# Patient Record
Sex: FEMALE | Race: WHITE | NOT HISPANIC OR LATINO | Employment: UNEMPLOYED | URBAN - METROPOLITAN AREA
[De-identification: names, ages, dates, MRNs, and addresses within clinical notes are randomized per-mention and may not be internally consistent; named-entity substitution may affect disease eponyms.]

---

## 2017-10-27 ENCOUNTER — TRANSCRIBE ORDERS (OUTPATIENT)
Dept: ADMINISTRATIVE | Facility: HOSPITAL | Age: 57
End: 2017-10-27

## 2017-10-27 DIAGNOSIS — Z78.0 POST-MENOPAUSAL: Primary | ICD-10-CM

## 2017-10-27 DIAGNOSIS — Z12.31 VISIT FOR SCREENING MAMMOGRAM: Primary | ICD-10-CM

## 2017-12-18 ENCOUNTER — HOSPITAL ENCOUNTER (OUTPATIENT)
Dept: MAMMOGRAPHY | Facility: HOSPITAL | Age: 57
Discharge: HOME | End: 2017-12-18
Attending: INTERNAL MEDICINE | Admitting: INTERNAL MEDICINE

## 2017-12-18 ENCOUNTER — HOSPITAL ENCOUNTER (OUTPATIENT)
Dept: BONE DENSITY | Facility: HOSPITAL | Age: 57
Discharge: HOME | End: 2017-12-18
Attending: INTERNAL MEDICINE

## 2017-12-18 DIAGNOSIS — Z12.31 VISIT FOR SCREENING MAMMOGRAM: ICD-10-CM

## 2017-12-18 DIAGNOSIS — Z78.0 POST-MENOPAUSAL: ICD-10-CM

## 2017-12-18 PROCEDURE — 77080 DXA BONE DENSITY AXIAL: CPT

## 2017-12-18 PROCEDURE — 77063 BREAST TOMOSYNTHESIS BI: CPT

## 2017-12-18 PROCEDURE — G0202 SCR MAMMO BI INCL CAD: HCPCS

## 2018-11-01 ENCOUNTER — TRANSCRIBE ORDERS (OUTPATIENT)
Dept: ADMINISTRATIVE | Facility: HOSPITAL | Age: 58
End: 2018-11-01

## 2018-11-01 DIAGNOSIS — R94.6 ABNORMAL THYROID FUNCTION TEST: Primary | ICD-10-CM

## 2018-11-01 DIAGNOSIS — Z12.31 VISIT FOR SCREENING MAMMOGRAM: Primary | ICD-10-CM

## 2018-12-21 ENCOUNTER — HOSPITAL ENCOUNTER (OUTPATIENT)
Dept: MAMMOGRAPHY | Facility: HOSPITAL | Age: 58
Discharge: HOME | End: 2018-12-21
Attending: INTERNAL MEDICINE | Admitting: INTERNAL MEDICINE

## 2018-12-21 ENCOUNTER — HOSPITAL ENCOUNTER (OUTPATIENT)
Dept: ULTRASOUND IMAGING | Facility: HOSPITAL | Age: 58
Discharge: HOME | End: 2018-12-21
Attending: INTERNAL MEDICINE

## 2018-12-21 DIAGNOSIS — Z12.31 VISIT FOR SCREENING MAMMOGRAM: ICD-10-CM

## 2018-12-21 DIAGNOSIS — R94.6 ABNORMAL THYROID FUNCTION TEST: ICD-10-CM

## 2018-12-21 PROCEDURE — 77067 SCR MAMMO BI INCL CAD: CPT

## 2018-12-21 PROCEDURE — 77063 BREAST TOMOSYNTHESIS BI: CPT

## 2018-12-21 PROCEDURE — 76536 US EXAM OF HEAD AND NECK: CPT

## 2019-11-01 ENCOUNTER — TRANSCRIBE ORDERS (OUTPATIENT)
Dept: ADMINISTRATIVE | Facility: HOSPITAL | Age: 59
End: 2019-11-01

## 2019-11-01 DIAGNOSIS — Z12.31 SCREENING MAMMOGRAM, ENCOUNTER FOR: Primary | ICD-10-CM

## 2020-01-25 ENCOUNTER — HOSPITAL ENCOUNTER (OUTPATIENT)
Dept: MAMMOGRAPHY | Facility: HOSPITAL | Age: 60
Discharge: HOME | End: 2020-01-25
Admitting: INTERNAL MEDICINE

## 2020-01-25 DIAGNOSIS — Z12.31 SCREENING MAMMOGRAM, ENCOUNTER FOR: ICD-10-CM

## 2020-01-25 PROCEDURE — 77063 BREAST TOMOSYNTHESIS BI: CPT

## 2020-01-25 PROCEDURE — 77067 SCR MAMMO BI INCL CAD: CPT

## 2021-09-22 ENCOUNTER — OFFICE VISIT (OUTPATIENT)
Dept: UROLOGY | Facility: CLINIC | Age: 61
End: 2021-09-22

## 2021-09-22 VITALS — HEIGHT: 63 IN | WEIGHT: 128.8 LBS | BODY MASS INDEX: 22.82 KG/M2 | RESPIRATION RATE: 17 BRPM

## 2021-09-22 DIAGNOSIS — N39.0 RECURRENT URINARY TRACT INFECTION: ICD-10-CM

## 2021-09-22 DIAGNOSIS — R31.29 MICROHEMATURIA: ICD-10-CM

## 2021-09-22 DIAGNOSIS — N39.0 RECURRENT UTI: Primary | ICD-10-CM

## 2021-09-22 LAB
BILIRUB BLD-MCNC: NEGATIVE MG/DL
CLARITY, POC: CLEAR
COLOR UR: YELLOW
GLUCOSE UR STRIP-MCNC: NEGATIVE MG/DL
KETONES UR QL: NEGATIVE
LEUKOCYTE EST, POC: ABNORMAL
NITRITE UR-MCNC: NEGATIVE MG/ML
PH UR: 6 [PH] (ref 5–8)
PROT UR STRIP-MCNC: NEGATIVE MG/DL
RBC # UR STRIP: NEGATIVE /UL
SP GR UR: 1.01 (ref 1–1.03)
URINE VOLUME: 0
UROBILINOGEN UR QL: NORMAL

## 2021-09-22 PROCEDURE — 99203 OFFICE O/P NEW LOW 30 MIN: CPT | Performed by: UROLOGY

## 2021-09-22 PROCEDURE — 51798 US URINE CAPACITY MEASURE: CPT | Performed by: UROLOGY

## 2021-09-22 RX ORDER — PANTOPRAZOLE SODIUM 40 MG/1
40 TABLET, DELAYED RELEASE ORAL NIGHTLY
COMMUNITY
Start: 2021-08-31

## 2021-09-22 RX ORDER — ATORVASTATIN CALCIUM 10 MG/1
10 TABLET, FILM COATED ORAL NIGHTLY
COMMUNITY

## 2021-09-22 RX ORDER — CEPHALEXIN 500 MG/1
500 CAPSULE ORAL DAILY
COMMUNITY
Start: 2021-08-31

## 2021-09-22 RX ORDER — CHOLECALCIFEROL (VITAMIN D3) 25 MCG
1000 CAPSULE ORAL DAILY
COMMUNITY
Start: 2021-07-23

## 2021-09-22 RX ORDER — GABAPENTIN 300 MG/1
300 CAPSULE ORAL NIGHTLY
COMMUNITY
Start: 2021-07-23

## 2021-09-22 RX ORDER — LISINOPRIL 10 MG/1
10 TABLET ORAL DAILY
COMMUNITY
Start: 2021-08-19

## 2021-09-22 RX ORDER — NITROFURANTOIN 25; 75 MG/1; MG/1
CAPSULE ORAL
COMMUNITY
Start: 2021-08-30 | End: 2021-10-18

## 2021-09-22 RX ORDER — METOPROLOL SUCCINATE 50 MG/1
50 TABLET, EXTENDED RELEASE ORAL DAILY
COMMUNITY
Start: 2021-08-19

## 2021-09-22 RX ORDER — FOLIC ACID 1 MG/1
1000 TABLET ORAL DAILY
COMMUNITY
Start: 2021-08-19

## 2021-09-22 RX ORDER — LANOLIN ALCOHOL/MO/W.PET/CERES
6 CREAM (GRAM) TOPICAL NIGHTLY PRN
COMMUNITY

## 2021-09-22 RX ORDER — MONTELUKAST SODIUM 10 MG/1
10 TABLET ORAL NIGHTLY
COMMUNITY
Start: 2021-07-28

## 2021-09-22 NOTE — PROGRESS NOTES
Chief Complaint: Urologic complaint    Subjective         History of Present Illness  Vaishali Castañeda is a 61 y.o. female presents to [facility] to be seen for microhematuria      Patient did have back surgery in 8/20 where she had a prolonged hospitalization did have a UTI at that time.    Since she has been doing better she is really been asymptomatic as far as urinary symptoms.    Patient has never had a symptomatic urinary tract infection.    Patient is on cephalexin 5 mg daily forever secondary to a Infected her on her back from last year surgery    Patient has had 3 UTIs treated in the last year.  She was not symptomatic.    5/21 Macrobid x7 days    UA 2+ blood to earlier this year    7/21 urine culture-negative  5/21 urine culture-negative    11/20 creatinine 0.4, GFR > 60  10/20 UA - moderate leukocytes, RBC 0, negative otherwise    No cultures in epic     no gross hematuria,  No history of kidney stone.    No urologic family history,   Has never had any urologic surgery.    No cardiopulmonary history.  Patient does not smoke.  Patient does not use blood thinner.          Results for orders placed or performed in visit on 09/22/21   Bladder Scan   Result Value Ref Range    Urine Volume 0    POC Urinalysis Dipstick    Specimen: Urine   Result Value Ref Range    Color Yellow Yellow, Straw, Dark Yellow, Tiara    Clarity, UA Clear Clear    Glucose, UA Negative Negative, 1000 mg/dL (3+) mg/dL    Bilirubin Negative Negative    Ketones, UA Negative Negative    Specific Gravity  1.015 1.005 - 1.030    Blood, UA Negative Negative    pH, Urine 6.0 5.0 - 8.0    Protein, POC Negative Negative mg/dL    Urobilinogen, UA Normal Normal    Leukocytes Small (1+) (A) Negative    Nitrite, UA Negative Negative         Objective     History reviewed. No pertinent past medical history.    History reviewed. No pertinent surgical history.      Current Outpatient Medications:   •  atorvastatin (LIPITOR) 10  "MG tablet, Take 10 mg by mouth., Disp: , Rfl:   •  cephalexin (KEFLEX) 500 MG capsule, Take 500 mg by mouth Daily., Disp: , Rfl:   •  Cholecalciferol (Vitamin D-3) 25 MCG (1000 UT) capsule, Take 1,000 Units by mouth Daily., Disp: , Rfl:   •  folic acid (FOLVITE) 1 MG tablet, Take 1,000 mcg by mouth Daily., Disp: , Rfl:   •  gabapentin (NEURONTIN) 300 MG capsule, TAKE 1 CAPSULE BY MOUTH THREE TIMES DAILY MAX DAILY AMOUNT 900MG (3 CAPSULES), Disp: , Rfl:   •  lisinopril (PRINIVIL,ZESTRIL) 10 MG tablet, Take 10 mg by mouth Daily., Disp: , Rfl:   •  melatonin 3 MG tablet, Take 6 mg by mouth Daily., Disp: , Rfl:   •  metoprolol succinate XL (TOPROL-XL) 50 MG 24 hr tablet, Take 50 mg by mouth Daily., Disp: , Rfl:   •  montelukast (SINGULAIR) 10 MG tablet, Take 10 mg by mouth Daily., Disp: , Rfl:   •  nitrofurantoin, macrocrystal-monohydrate, (MACROBID) 100 MG capsule, TAKE 1 CAPSULE BY MOUTH TWICE DAILY FOR 5 DAYS, Disp: , Rfl:   •  pantoprazole (PROTONIX) 40 MG EC tablet, Take 40 mg by mouth Daily., Disp: , Rfl:     Allergies   Allergen Reactions   • Sulfa Antibiotics Nausea Only        Family History   Problem Relation Age of Onset   • Breast cancer Sister        Social History     Socioeconomic History   • Marital status:      Spouse name: Not on file   • Number of children: Not on file   • Years of education: Not on file   • Highest education level: Not on file   Tobacco Use   • Smoking status: Never Smoker   • Smokeless tobacco: Never Used   Substance and Sexual Activity   • Alcohol use: Yes     Comment: 1 drink daily   • Drug use: Never       Vital Signs:   Resp 17   Ht 160 cm (63\")   Wt 58.4 kg (128 lb 12.8 oz)   BMI 22.82 kg/m²      Physical exam    Alert and orient x3  Well appearing, well developed, in no acute distress   Unlabored respirations  Nontender/nondistended      Grossly oriented to person, place and time, judgment is intact, normal mood and affect    Results for orders placed or performed " in visit on 09/22/21   Bladder Scan   Result Value Ref Range    Urine Volume 0    POC Urinalysis Dipstick    Specimen: Urine   Result Value Ref Range    Color Yellow Yellow, Straw, Dark Yellow, Tiara    Clarity, UA Clear Clear    Glucose, UA Negative Negative, 1000 mg/dL (3+) mg/dL    Bilirubin Negative Negative    Ketones, UA Negative Negative    Specific Gravity  1.015 1.005 - 1.030    Blood, UA Negative Negative    pH, Urine 6.0 5.0 - 8.0    Protein, POC Negative Negative mg/dL    Urobilinogen, UA Normal Normal    Leukocytes Small (1+) (A) Negative    Nitrite, UA Negative Negative             Assessment and Plan    Diagnoses and all orders for this visit:    1. Recurrent UTI (Primary)  -     POC Urinalysis Dipstick  -     Bladder Scan    2. Recurrent urinary tract infection    Records reviewed today    Patient after review of her records is really not having recurrent infections.  She has been asymptomatic and I discussed with her she does not need to have her urine checked if she is asymptomatic.  Discussed with the patient that that is normal for some females to get 1-2 UTIs a year.  These would usually be symptomatic with burning/dysuria or suprapubic pressure fever.  Patient voiced understanding    We did discuss ways to prevent risk of urinary tract infection today    Microhematuria    Discussed with the patient because she has had several episodes of microhematuria I do recommend microhematuria work-up.  We discussed that this work-up could risk missing a urologic malignancy which would detriment to her health or cause death.  Patient voiced understanding and would like to proceed    BMP  CT urology protocol  Office cystoscopy    Risks and benefits were discussed and patient would like to proceed

## 2021-09-29 ENCOUNTER — TELEPHONE (OUTPATIENT)
Dept: UROLOGY | Facility: CLINIC | Age: 61
End: 2021-09-29

## 2021-10-18 ENCOUNTER — PRE-ADMISSION TESTING (OUTPATIENT)
Dept: PREADMISSION TESTING | Facility: HOSPITAL | Age: 61
End: 2021-10-18

## 2021-10-18 VITALS
OXYGEN SATURATION: 99 % | HEIGHT: 63 IN | WEIGHT: 129 LBS | RESPIRATION RATE: 16 BRPM | HEART RATE: 75 BPM | BODY MASS INDEX: 22.86 KG/M2 | SYSTOLIC BLOOD PRESSURE: 133 MMHG | TEMPERATURE: 98.7 F | DIASTOLIC BLOOD PRESSURE: 81 MMHG

## 2021-10-18 LAB
ALBUMIN SERPL-MCNC: 5 G/DL (ref 3.5–5.2)
ALBUMIN/GLOB SERPL: 2.8 G/DL
ALP SERPL-CCNC: 69 U/L (ref 39–117)
ALT SERPL W P-5'-P-CCNC: 46 U/L (ref 1–33)
ANION GAP SERPL CALCULATED.3IONS-SCNC: 15.2 MMOL/L (ref 5–15)
AST SERPL-CCNC: 71 U/L (ref 1–32)
BASOPHILS # BLD AUTO: 0.08 10*3/MM3 (ref 0–0.2)
BASOPHILS NFR BLD AUTO: 1.3 % (ref 0–1.5)
BILIRUB SERPL-MCNC: 0.3 MG/DL (ref 0–1.2)
BUN SERPL-MCNC: 9 MG/DL (ref 8–23)
BUN/CREAT SERPL: 12.5 (ref 7–25)
CALCIUM SPEC-SCNC: 9.2 MG/DL (ref 8.6–10.5)
CHLORIDE SERPL-SCNC: 99 MMOL/L (ref 98–107)
CO2 SERPL-SCNC: 20.8 MMOL/L (ref 22–29)
CREAT SERPL-MCNC: 0.72 MG/DL (ref 0.57–1)
DEPRECATED RDW RBC AUTO: 39.6 FL (ref 37–54)
EOSINOPHIL # BLD AUTO: 0.15 10*3/MM3 (ref 0–0.4)
EOSINOPHIL NFR BLD AUTO: 2.5 % (ref 0.3–6.2)
ERYTHROCYTE [DISTWIDTH] IN BLOOD BY AUTOMATED COUNT: 10.8 % (ref 12.3–15.4)
GFR SERPL CREATININE-BSD FRML MDRD: 82 ML/MIN/1.73
GLOBULIN UR ELPH-MCNC: 1.8 GM/DL
GLUCOSE SERPL-MCNC: 116 MG/DL (ref 65–99)
HCT VFR BLD AUTO: 34.3 % (ref 34–46.6)
HGB BLD-MCNC: 11.5 G/DL (ref 12–15.9)
IMM GRANULOCYTES # BLD AUTO: 0.01 10*3/MM3 (ref 0–0.05)
IMM GRANULOCYTES NFR BLD AUTO: 0.2 % (ref 0–0.5)
LYMPHOCYTES # BLD AUTO: 1.99 10*3/MM3 (ref 0.7–3.1)
LYMPHOCYTES NFR BLD AUTO: 33.4 % (ref 19.6–45.3)
MCH RBC QN AUTO: 33.6 PG (ref 26.6–33)
MCHC RBC AUTO-ENTMCNC: 33.5 G/DL (ref 31.5–35.7)
MCV RBC AUTO: 100.3 FL (ref 79–97)
MONOCYTES # BLD AUTO: 0.64 10*3/MM3 (ref 0.1–0.9)
MONOCYTES NFR BLD AUTO: 10.8 % (ref 5–12)
NEUTROPHILS NFR BLD AUTO: 3.08 10*3/MM3 (ref 1.7–7)
NEUTROPHILS NFR BLD AUTO: 51.8 % (ref 42.7–76)
NRBC BLD AUTO-RTO: 0 /100 WBC (ref 0–0.2)
PLATELET # BLD AUTO: 219 10*3/MM3 (ref 140–450)
PMV BLD AUTO: 10.7 FL (ref 6–12)
POTASSIUM SERPL-SCNC: 4.6 MMOL/L (ref 3.5–5.2)
PROT SERPL-MCNC: 6.8 G/DL (ref 6–8.5)
QT INTERVAL: 372 MS
RBC # BLD AUTO: 3.42 10*6/MM3 (ref 3.77–5.28)
SARS-COV-2 ORF1AB RESP QL NAA+PROBE: NOT DETECTED
SODIUM SERPL-SCNC: 135 MMOL/L (ref 136–145)
WBC # BLD AUTO: 5.95 10*3/MM3 (ref 3.4–10.8)

## 2021-10-18 PROCEDURE — 80053 COMPREHEN METABOLIC PANEL: CPT

## 2021-10-18 PROCEDURE — 93010 ELECTROCARDIOGRAM REPORT: CPT | Performed by: INTERNAL MEDICINE

## 2021-10-18 PROCEDURE — U0004 COV-19 TEST NON-CDC HGH THRU: HCPCS | Performed by: NURSE PRACTITIONER

## 2021-10-18 PROCEDURE — 36415 COLL VENOUS BLD VENIPUNCTURE: CPT

## 2021-10-18 PROCEDURE — 85025 COMPLETE CBC W/AUTO DIFF WBC: CPT

## 2021-10-18 PROCEDURE — 93005 ELECTROCARDIOGRAM TRACING: CPT

## 2021-10-18 PROCEDURE — C9803 HOPD COVID-19 SPEC COLLECT: HCPCS | Performed by: NURSE PRACTITIONER

## 2021-10-18 RX ORDER — HYDROCODONE BITARTRATE AND ACETAMINOPHEN 7.5; 325 MG/1; MG/1
1 TABLET ORAL EVERY 6 HOURS PRN
COMMUNITY
End: 2021-10-20 | Stop reason: HOSPADM

## 2021-10-18 RX ORDER — ACETAMINOPHEN 500 MG
500 TABLET ORAL EVERY 6 HOURS PRN
COMMUNITY
End: 2021-10-20 | Stop reason: HOSPADM

## 2021-10-18 NOTE — DISCHARGE INSTRUCTIONS
Take the following medications the morning of surgery:    METOPROLOL, PANTOPRAZOLE      ARRIVAL TIME: TO BE DETERMINED  If you are on prescription narcotic pain medication to control your pain you may also take that medication the morning of surgery.    General Instructions:  • Do not eat solid food after midnight the night before surgery.  • You may drink clear liquids day of surgery but must stop at least one hour before your hospital arrival time.  • It is beneficial for you to have a clear drink that contains carbohydrates the day of surgery.  We suggest a 12 to 20 ounce bottle of Gatorade or Powerade for non-diabetic patients or a 12 to 20 ounce bottle of G2 or Powerade Zero for diabetic patients. (Pediatric patients, are not advised to drink a 12 to 20 ounce carbohydrate drink)    Clear liquids are liquids you can see through.  Nothing red in color.     Plain water                               Sports drinks  Sodas                                   Gelatin (Jell-O)  Fruit juices without pulp such as white grape juice and apple juice  Popsicles that contain no fruit or yogurt  Tea or coffee (no cream or milk added)  Gatorade / Powerade  G2 / Powerade Zero      • Patients who avoid smoking, chewing tobacco and alcohol for 4 weeks prior to surgery have a reduced risk of post-operative complications.  Quit smoking as many days before surgery as you can.  • Do not smoke, use chewing tobacco or drink alcohol the day of surgery.   • If applicable bring your C-PAP/ BI-PAP machine.  • Bring any papers given to you in the doctor’s office.  • Wear clean comfortable clothes.  • Do not wear contact lenses, false eyelashes or make-up.  Bring a case for your glasses.   • Bring crutches or walker if applicable.  • Remove all piercings.  Leave jewelry and any other valuables at home.  • Hair extensions with metal clips must be removed prior to surgery.  • The Pre-Admission Testing nurse will instruct you to bring medications  if unable to obtain an accurate list in Pre-Admission Testing.          Preventing a Surgical Site Infection:  • For 2 to 3 days before surgery, avoid shaving with a razor because the razor can irritate skin and make it easier to develop an infection.    • Any areas of open skin can increase the risk of a post-operative wound infection by allowing bacteria to enter and travel throughout the body.  Notify your surgeon if you have any skin wounds / rashes even if it is not near the expected surgical site.  The area will need assessed to determine if surgery should be delayed until it is healed.  • The night prior to surgery shower using a fresh bar of anti-bacterial soap (such as Dial) and clean washcloth.  Sleep in a clean bed with clean clothing.  Do not allow pets to sleep with you.  • Shower on the morning of surgery using a fresh bar of anti-bacterial soap (such as Dial) and clean washcloth.  Dry with a clean towel and dress in clean clothing.  • Ask your surgeon if you will be receiving antibiotics prior to surgery.  • Make sure you, your family, and all healthcare providers clean their hands with soap and water or an alcohol based hand  before caring for you or your wound.    Day of surgery:  Your arrival time is approximately two hours before your scheduled surgery time.  Upon arrival, a Pre-op nurse and Anesthesiologist will review your health history, obtain vital signs, and answer questions you may have.  The only belongings needed at this time will be a list of your home medications and if applicable your C-PAP/BI-PAP machine.  A Pre-op nurse will start an IV and you may receive medication in preparation for surgery, including something to help you relax.     Please be aware that surgery does come with discomfort.  We want to make every effort to control your discomfort so please discuss any uncontrolled symptoms with your nurse.   Your doctor will most likely have prescribed pain medications.       If you are going home after surgery you will receive individualized written care instructions before being discharged.  A responsible adult must drive you to and from the hospital on the day of your surgery and stay with you for 24 hours.  Discharge prescriptions can be filled by the hospital pharmacy during regular pharmacy hours.  If you are having surgery late in the day/evening your prescription may be e-prescribed to your pharmacy.  Please verify your pharmacy hours or chose a 24 hour pharmacy to avoid not having access to your prescription because your pharmacy has closed for the day.    If you are staying overnight following surgery, you will be transported to your hospital room following the recovery period.  [facility] has all private rooms.    If you have any questions please call Pre-Admission Testing at [phone].  Deductibles and co-payments are collected on the day of service. Please be prepared to pay the required co-pay, deductible or deposit on the day of service as defined by your plan.    Patient Education for Self-Quarantine Process    • Following your COVID testing, we strongly recommend that you wear a mask when you are with other people and practice social distancing.   • Limit your activities to only required outings.  • Wash your hands with soap and water frequently for at least 20 seconds.   • Avoid touching your eyes, nose and mouth with unwashed hands.  • Do not share anything - utensils, drinking glasses, food from the same bowl.   • Sanitize household surfaces daily. Include all high touch areas (door handles, light switches, phones, countertops, etc.)    Call your surgeon immediately if you experience any of the following symptoms:  • Sore Throat  • Shortness of Breath or difficulty breathing  • Cough  • Chills  • Body soreness or muscle pain  • Headache  • Fever  • New loss of taste or smell  • Do not arrive for your surgery ill.  Your procedure will need to be  rescheduled to another time.  You will need to call your physician before the day of surgery to avoid any unnecessary exposure to hospital staff as well as other patients.

## 2021-10-19 RX ORDER — CEFAZOLIN SODIUM 2 G/100ML
2 INJECTION, SOLUTION INTRAVENOUS ONCE
Status: COMPLETED | OUTPATIENT
Start: 2021-10-19 | End: 2021-10-20

## 2021-10-19 NOTE — H&P
Provider: DION TORRES MD  [facility]  Patient is here for recheck of her right shoulder and to discuss the findings of her MRI scan. She still cannot  anything of any significant weight without anterior shoulder pain. She cannot sleep due to the right shoulder pain. This was not improved with the injection that she received a couple months ago. She has been to physical therapy and feels like her spine and neck have improved but her right shoulder has not.  Social history was automatically updated to reflect changes to the patient's age and marital status.    Allergies, medications, past medical history, surgical history, family history, social history and ROS were reviewed and unchanged (08/26/2021).    PCP Dr. DAPHNE MARQUIS, [facility]is[facility] Frisian    Physical Exam  Height:  64 in.    Weight:  125 lbs.     BMI:  21.53    Right Shoulder  Skin is normal.  There is no atrophy.  There is no effusion.  There is no warmth.  No erythema.  Lymphadenopathy is negative.  Right shoulder active ROM today shows: Elevation = 160; ER(side) = 60; ER(abd) = 70; IR(abd) = 60; IR(vert) = to waist; Abduction = 120.  Shoulder strength: Elevation = 3/5; ER = 4/5; IR = 5-/5; ABD = 4/5.  Neer test is positive.  Sanford test is positive.  Arc of motion is positive.  Empty can test was positive.  Speed's test is positive.  Yergason's test is positive.  Pulses are normal.  Normal sensation.  Capillary refill is normal.        Imaging/Diagnostic Studies  Right shoulder MRI shows 1. Full-thickness tear of superior fibers of subscapularis  2. Medial dislocation of biceps tendon  3. 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 I think she would benefit from arthroscopy of the right shoulder for bicep tenodesis, rotator cuff repair, subscapularis  repair and acromioplasty. She is going to consult with her schedule and her family and will get back to us regarding timing of scheduling. Today we discussed the procedure in detail.  We discussed the need for physical therapy, immobilization with the sling and the expected recovery period.  We discussed both the acute and long-term risks of the surgery.  All questions were answered and informed consent was obtained.          Electronically signed by DION TORRES MD on 08/26/2021 at 10:07 AM    ________________________________________________________________________

## 2021-10-20 ENCOUNTER — HOSPITAL ENCOUNTER (OUTPATIENT)
Facility: HOSPITAL | Age: 61
Setting detail: HOSPITAL OUTPATIENT SURGERY
Discharge: HOME | End: 2021-10-20
Attending: ORTHOPAEDIC SURGERY | Admitting: ORTHOPAEDIC SURGERY

## 2021-10-20 ENCOUNTER — ANESTHESIA EVENT (OUTPATIENT)
Dept: PERIOP | Facility: HOSPITAL | Age: 61
End: 2021-10-20

## 2021-10-20 ENCOUNTER — ANESTHESIA (OUTPATIENT)
Dept: PERIOP | Facility: HOSPITAL | Age: 61
End: 2021-10-20

## 2021-10-20 VITALS
RESPIRATION RATE: 16 BRPM | OXYGEN SATURATION: 96 % | HEART RATE: 65 BPM | SYSTOLIC BLOOD PRESSURE: 145 MMHG | DIASTOLIC BLOOD PRESSURE: 73 MMHG | TEMPERATURE: 97.4 F

## 2021-10-20 PROCEDURE — 25010000002 MIDAZOLAM PER 1 MG: Performed by: ANESTHESIOLOGY

## 2021-10-20 PROCEDURE — 25010000002 EPINEPHRINE PER 0.1 MG: Performed by: ORTHOPAEDIC SURGERY

## 2021-10-20 PROCEDURE — 25010000002 FENTANYL CITRATE (PF) 50 MCG/ML SOLUTION: Performed by: NURSE ANESTHETIST, CERTIFIED REGISTERED

## 2021-10-20 PROCEDURE — 25010000002 PHENYLEPHRINE 10 MG/ML SOLUTION: Performed by: NURSE ANESTHETIST, CERTIFIED REGISTERED

## 2021-10-20 PROCEDURE — 25010000002 FENTANYL CITRATE (PF) 50 MCG/ML SOLUTION: Performed by: ANESTHESIOLOGY

## 2021-10-20 PROCEDURE — C1713 ANCHOR/SCREW BN/BN,TIS/BN: HCPCS | Performed by: ORTHOPAEDIC SURGERY

## 2021-10-20 PROCEDURE — 25010000002 METHYLPREDNISOLONE PER 40 MG: Performed by: ANESTHESIOLOGY

## 2021-10-20 PROCEDURE — 25010000002 NEOSTIGMINE 5 MG/10ML SOLUTION: Performed by: NURSE ANESTHETIST, CERTIFIED REGISTERED

## 2021-10-20 PROCEDURE — 76942 ECHO GUIDE FOR BIOPSY: CPT | Performed by: ORTHOPAEDIC SURGERY

## 2021-10-20 PROCEDURE — 25010000002 ONDANSETRON PER 1 MG: Performed by: NURSE ANESTHETIST, CERTIFIED REGISTERED

## 2021-10-20 PROCEDURE — 25010000002 PROPOFOL 10 MG/ML EMULSION: Performed by: NURSE ANESTHETIST, CERTIFIED REGISTERED

## 2021-10-20 PROCEDURE — 25010000002 ROPIVACAINE PER 1 MG: Performed by: ANESTHESIOLOGY

## 2021-10-20 PROCEDURE — 25010000003 CEFAZOLIN IN DEXTROSE 2-4 GM/100ML-% SOLUTION: Performed by: ORTHOPAEDIC SURGERY

## 2021-10-20 DEVICE — IMPLANTABLE DEVICE
Type: IMPLANTABLE DEVICE | Site: SHOULDER | Status: FUNCTIONAL
Brand: JUGGERKNOT SOFT ANCHORS

## 2021-10-20 RX ORDER — DIPHENHYDRAMINE HCL 25 MG
25 CAPSULE ORAL
Status: DISCONTINUED | OUTPATIENT
Start: 2021-10-20 | End: 2021-10-20 | Stop reason: HOSPADM

## 2021-10-20 RX ORDER — ONDANSETRON 2 MG/ML
INJECTION INTRAMUSCULAR; INTRAVENOUS AS NEEDED
Status: DISCONTINUED | OUTPATIENT
Start: 2021-10-20 | End: 2021-10-20 | Stop reason: SURG

## 2021-10-20 RX ORDER — LIDOCAINE HYDROCHLORIDE AND EPINEPHRINE BITARTRATE 20; .01 MG/ML; MG/ML
INJECTION, SOLUTION SUBCUTANEOUS
Status: COMPLETED | OUTPATIENT
Start: 2021-10-20 | End: 2021-10-20

## 2021-10-20 RX ORDER — MIDAZOLAM HYDROCHLORIDE 1 MG/ML
1 INJECTION INTRAMUSCULAR; INTRAVENOUS
Status: DISCONTINUED | OUTPATIENT
Start: 2021-10-20 | End: 2021-10-20 | Stop reason: HOSPADM

## 2021-10-20 RX ORDER — OXYCODONE HYDROCHLORIDE AND ACETAMINOPHEN 5; 325 MG/1; MG/1
1 TABLET ORAL EVERY 4 HOURS PRN
Qty: 40 TABLET | Refills: 0 | Status: SHIPPED | OUTPATIENT
Start: 2021-10-20

## 2021-10-20 RX ORDER — FLUMAZENIL 0.1 MG/ML
0.2 INJECTION INTRAVENOUS AS NEEDED
Status: DISCONTINUED | OUTPATIENT
Start: 2021-10-20 | End: 2021-10-20 | Stop reason: HOSPADM

## 2021-10-20 RX ORDER — NALOXONE HCL 0.4 MG/ML
0.2 VIAL (ML) INJECTION AS NEEDED
Status: DISCONTINUED | OUTPATIENT
Start: 2021-10-20 | End: 2021-10-20 | Stop reason: HOSPADM

## 2021-10-20 RX ORDER — LIDOCAINE HYDROCHLORIDE 20 MG/ML
INJECTION, SOLUTION INFILTRATION; PERINEURAL AS NEEDED
Status: DISCONTINUED | OUTPATIENT
Start: 2021-10-20 | End: 2021-10-20 | Stop reason: SURG

## 2021-10-20 RX ORDER — FAMOTIDINE 10 MG/ML
20 INJECTION, SOLUTION INTRAVENOUS ONCE
Status: COMPLETED | OUTPATIENT
Start: 2021-10-20 | End: 2021-10-20

## 2021-10-20 RX ORDER — PROPOFOL 10 MG/ML
VIAL (ML) INTRAVENOUS AS NEEDED
Status: DISCONTINUED | OUTPATIENT
Start: 2021-10-20 | End: 2021-10-20 | Stop reason: SURG

## 2021-10-20 RX ORDER — NEOSTIGMINE METHYLSULFATE 0.5 MG/ML
INJECTION, SOLUTION INTRAVENOUS AS NEEDED
Status: DISCONTINUED | OUTPATIENT
Start: 2021-10-20 | End: 2021-10-20 | Stop reason: SURG

## 2021-10-20 RX ORDER — IBUPROFEN 600 MG/1
600 TABLET ORAL ONCE AS NEEDED
Status: DISCONTINUED | OUTPATIENT
Start: 2021-10-20 | End: 2021-10-20 | Stop reason: HOSPADM

## 2021-10-20 RX ORDER — METHYLPREDNISOLONE ACETATE 40 MG/ML
INJECTION, SUSPENSION INTRA-ARTICULAR; INTRALESIONAL; INTRAMUSCULAR; SOFT TISSUE
Status: COMPLETED | OUTPATIENT
Start: 2021-10-20 | End: 2021-10-20

## 2021-10-20 RX ORDER — ROCURONIUM BROMIDE 10 MG/ML
INJECTION, SOLUTION INTRAVENOUS AS NEEDED
Status: DISCONTINUED | OUTPATIENT
Start: 2021-10-20 | End: 2021-10-20 | Stop reason: SURG

## 2021-10-20 RX ORDER — DIPHENHYDRAMINE HYDROCHLORIDE 50 MG/ML
12.5 INJECTION INTRAMUSCULAR; INTRAVENOUS
Status: DISCONTINUED | OUTPATIENT
Start: 2021-10-20 | End: 2021-10-20 | Stop reason: HOSPADM

## 2021-10-20 RX ORDER — EPHEDRINE SULFATE 50 MG/ML
5 INJECTION, SOLUTION INTRAVENOUS ONCE AS NEEDED
Status: DISCONTINUED | OUTPATIENT
Start: 2021-10-20 | End: 2021-10-20 | Stop reason: HOSPADM

## 2021-10-20 RX ORDER — SODIUM CHLORIDE, SODIUM LACTATE, POTASSIUM CHLORIDE, CALCIUM CHLORIDE 600; 310; 30; 20 MG/100ML; MG/100ML; MG/100ML; MG/100ML
9 INJECTION, SOLUTION INTRAVENOUS CONTINUOUS
Status: DISCONTINUED | OUTPATIENT
Start: 2021-10-20 | End: 2021-10-20 | Stop reason: HOSPADM

## 2021-10-20 RX ORDER — HYDRALAZINE HYDROCHLORIDE 20 MG/ML
5 INJECTION INTRAMUSCULAR; INTRAVENOUS
Status: DISCONTINUED | OUTPATIENT
Start: 2021-10-20 | End: 2021-10-20 | Stop reason: HOSPADM

## 2021-10-20 RX ORDER — OXYCODONE AND ACETAMINOPHEN 7.5; 325 MG/1; MG/1
2 TABLET ORAL EVERY 4 HOURS PRN
Status: DISCONTINUED | OUTPATIENT
Start: 2021-10-20 | End: 2021-10-20 | Stop reason: HOSPADM

## 2021-10-20 RX ORDER — SODIUM CHLORIDE, SODIUM LACTATE, POTASSIUM CHLORIDE, CALCIUM CHLORIDE 600; 310; 30; 20 MG/100ML; MG/100ML; MG/100ML; MG/100ML
100 INJECTION, SOLUTION INTRAVENOUS CONTINUOUS
Status: DISCONTINUED | OUTPATIENT
Start: 2021-10-20 | End: 2021-10-20 | Stop reason: HOSPADM

## 2021-10-20 RX ORDER — PHENYLEPHRINE HYDROCHLORIDE 10 MG/ML
INJECTION INTRAVENOUS AS NEEDED
Status: DISCONTINUED | OUTPATIENT
Start: 2021-10-20 | End: 2021-10-20 | Stop reason: SURG

## 2021-10-20 RX ORDER — PROMETHAZINE HYDROCHLORIDE 25 MG/1
25 TABLET ORAL ONCE AS NEEDED
Status: DISCONTINUED | OUTPATIENT
Start: 2021-10-20 | End: 2021-10-20 | Stop reason: HOSPADM

## 2021-10-20 RX ORDER — ONDANSETRON 2 MG/ML
4 INJECTION INTRAMUSCULAR; INTRAVENOUS ONCE AS NEEDED
Status: DISCONTINUED | OUTPATIENT
Start: 2021-10-20 | End: 2021-10-20 | Stop reason: HOSPADM

## 2021-10-20 RX ORDER — IPRATROPIUM BROMIDE AND ALBUTEROL SULFATE 2.5; .5 MG/3ML; MG/3ML
3 SOLUTION RESPIRATORY (INHALATION) ONCE AS NEEDED
Status: DISCONTINUED | OUTPATIENT
Start: 2021-10-20 | End: 2021-10-20 | Stop reason: HOSPADM

## 2021-10-20 RX ORDER — FENTANYL CITRATE 50 UG/ML
50 INJECTION, SOLUTION INTRAMUSCULAR; INTRAVENOUS
Status: DISCONTINUED | OUTPATIENT
Start: 2021-10-20 | End: 2021-10-20 | Stop reason: HOSPADM

## 2021-10-20 RX ORDER — PROMETHAZINE HYDROCHLORIDE 25 MG/1
25 SUPPOSITORY RECTAL ONCE AS NEEDED
Status: DISCONTINUED | OUTPATIENT
Start: 2021-10-20 | End: 2021-10-20 | Stop reason: HOSPADM

## 2021-10-20 RX ORDER — LIDOCAINE HYDROCHLORIDE 10 MG/ML
0.5 INJECTION, SOLUTION EPIDURAL; INFILTRATION; INTRACAUDAL; PERINEURAL ONCE AS NEEDED
Status: DISCONTINUED | OUTPATIENT
Start: 2021-10-20 | End: 2021-10-20 | Stop reason: HOSPADM

## 2021-10-20 RX ORDER — SODIUM CHLORIDE 0.9 % (FLUSH) 0.9 %
3-10 SYRINGE (ML) INJECTION AS NEEDED
Status: DISCONTINUED | OUTPATIENT
Start: 2021-10-20 | End: 2021-10-20 | Stop reason: HOSPADM

## 2021-10-20 RX ORDER — GLYCOPYRROLATE 0.2 MG/ML
INJECTION INTRAMUSCULAR; INTRAVENOUS AS NEEDED
Status: DISCONTINUED | OUTPATIENT
Start: 2021-10-20 | End: 2021-10-20 | Stop reason: SURG

## 2021-10-20 RX ORDER — LABETALOL HYDROCHLORIDE 5 MG/ML
5 INJECTION, SOLUTION INTRAVENOUS
Status: DISCONTINUED | OUTPATIENT
Start: 2021-10-20 | End: 2021-10-20 | Stop reason: HOSPADM

## 2021-10-20 RX ORDER — FENTANYL CITRATE 50 UG/ML
INJECTION, SOLUTION INTRAMUSCULAR; INTRAVENOUS AS NEEDED
Status: DISCONTINUED | OUTPATIENT
Start: 2021-10-20 | End: 2021-10-20 | Stop reason: SURG

## 2021-10-20 RX ORDER — ROPIVACAINE HYDROCHLORIDE 5 MG/ML
INJECTION, SOLUTION EPIDURAL; INFILTRATION; PERINEURAL
Status: COMPLETED | OUTPATIENT
Start: 2021-10-20 | End: 2021-10-20

## 2021-10-20 RX ORDER — SODIUM CHLORIDE 0.9 % (FLUSH) 0.9 %
3 SYRINGE (ML) INJECTION EVERY 12 HOURS SCHEDULED
Status: DISCONTINUED | OUTPATIENT
Start: 2021-10-20 | End: 2021-10-20 | Stop reason: HOSPADM

## 2021-10-20 RX ORDER — HYDROCODONE BITARTRATE AND ACETAMINOPHEN 5; 325 MG/1; MG/1
1 TABLET ORAL ONCE AS NEEDED
Status: DISCONTINUED | OUTPATIENT
Start: 2021-10-20 | End: 2021-10-20 | Stop reason: HOSPADM

## 2021-10-20 RX ADMIN — PHENYLEPHRINE HYDROCHLORIDE 200 MCG: 10 INJECTION, SOLUTION INTRAVENOUS at 10:59

## 2021-10-20 RX ADMIN — FENTANYL CITRATE 50 MCG: 50 INJECTION INTRAMUSCULAR; INTRAVENOUS at 10:55

## 2021-10-20 RX ADMIN — PHENYLEPHRINE HYDROCHLORIDE 100 MCG: 10 INJECTION, SOLUTION INTRAVENOUS at 11:31

## 2021-10-20 RX ADMIN — FAMOTIDINE 20 MG: 10 INJECTION INTRAVENOUS at 09:06

## 2021-10-20 RX ADMIN — GLYCOPYRROLATE 0.4 MG: 0.2 INJECTION INTRAMUSCULAR; INTRAVENOUS at 11:48

## 2021-10-20 RX ADMIN — MIDAZOLAM 1 MG: 1 INJECTION INTRAMUSCULAR; INTRAVENOUS at 09:28

## 2021-10-20 RX ADMIN — CEFAZOLIN SODIUM 2 G: 2 INJECTION, SOLUTION INTRAVENOUS at 10:50

## 2021-10-20 RX ADMIN — PHENYLEPHRINE HYDROCHLORIDE 200 MCG: 10 INJECTION, SOLUTION INTRAVENOUS at 11:04

## 2021-10-20 RX ADMIN — ONDANSETRON 4 MG: 2 INJECTION INTRAMUSCULAR; INTRAVENOUS at 11:49

## 2021-10-20 RX ADMIN — LIDOCAINE HYDROCHLORIDE 60 MG: 20 INJECTION, SOLUTION INFILTRATION; PERINEURAL at 10:58

## 2021-10-20 RX ADMIN — NEOSTIGMINE METHYLSULFATE 3 MG: 0.5 INJECTION INTRAVENOUS at 11:48

## 2021-10-20 RX ADMIN — PROPOFOL 100 MG: 10 INJECTION, EMULSION INTRAVENOUS at 10:58

## 2021-10-20 RX ADMIN — PHENYLEPHRINE HYDROCHLORIDE 100 MCG: 10 INJECTION, SOLUTION INTRAVENOUS at 11:43

## 2021-10-20 RX ADMIN — PHENYLEPHRINE HYDROCHLORIDE 200 MCG: 10 INJECTION, SOLUTION INTRAVENOUS at 11:23

## 2021-10-20 RX ADMIN — PHENYLEPHRINE HYDROCHLORIDE 100 MCG: 10 INJECTION, SOLUTION INTRAVENOUS at 11:00

## 2021-10-20 RX ADMIN — METHYLPREDNISOLONE ACETATE 40 MG: 40 INJECTION, SUSPENSION INTRA-ARTICULAR; INTRALESIONAL; INTRAMUSCULAR; SOFT TISSUE at 09:37

## 2021-10-20 RX ADMIN — ROCURONIUM BROMIDE 30 MG: 50 INJECTION INTRAVENOUS at 10:58

## 2021-10-20 RX ADMIN — FENTANYL CITRATE 50 MCG: 50 INJECTION INTRAMUSCULAR; INTRAVENOUS at 09:27

## 2021-10-20 RX ADMIN — ROPIVACAINE HYDROCHLORIDE 25 ML: 5 INJECTION EPIDURAL; INFILTRATION; PERINEURAL at 09:37

## 2021-10-20 RX ADMIN — LIDOCAINE HYDROCHLORIDE,EPINEPHRINE BITARTRATE 5 ML: 20; .01 INJECTION, SOLUTION INFILTRATION; PERINEURAL at 09:37

## 2021-10-20 RX ADMIN — SODIUM CHLORIDE, POTASSIUM CHLORIDE, SODIUM LACTATE AND CALCIUM CHLORIDE 9 ML/HR: 600; 310; 30; 20 INJECTION, SOLUTION INTRAVENOUS at 09:05

## 2021-10-20 RX ADMIN — PHENYLEPHRINE HYDROCHLORIDE 100 MCG: 10 INJECTION, SOLUTION INTRAVENOUS at 11:16

## 2021-10-20 NOTE — ANESTHESIA POSTPROCEDURE EVALUATION
Patient: aVishali CARLOS Crush    Procedure Summary     Date: 10/20/21 Room / Location:  [facility]  [facility]    Anesthesia Start: 1050 Anesthesia Stop: 1207    Procedure: ARTHROSCOPYOF THE RIGHT SHOULDER FOR, ROTATOR CUFF REPAIR, SUBSCAPULARIS REPAIR AND ACROMIOPLASTY, extensive debridement (Right Shoulder) Diagnosis:     Surgeons: Jose Garcia MD Provider: Gilles Brewer MD    Anesthesia Type: general with block ASA Status: 2          Anesthesia Type: general with block    Vitals  Vitals Value Taken Time   /67 10/20/21 1246   Temp 36.6 °C (97.9 °F) 10/20/21 1203   Pulse 59 10/20/21 1248   Resp 16 10/20/21 1230   SpO2 95 % 10/20/21 1248   Vitals shown include unvalidated device data.        Post Anesthesia Care and Evaluation    Patient location during evaluation: bedside  Patient participation: complete - patient participated  Level of consciousness: awake and alert  Pain score: 0  Pain management: adequate  Airway patency: patent  Anesthetic complications: No anesthetic complications    Cardiovascular status: acceptable  Respiratory status: acceptable  Hydration status: acceptable    Comments: /62 (BP Location: Left arm, Patient Position: Lying)   Pulse 58   Temp 36.6 °C (97.9 °F) (Temporal)   Resp 16   LMP  (LMP Unknown)   SpO2 94%

## 2021-10-20 NOTE — OP NOTE
SHOULDER ARTHROSCOPY WITH ROTATOR CUFF REPAIR  Procedure Note    Vaishali Castañeda  10/20/2021    Pre-op Diagnosis:   1.  Right rotator cuff tear  2.  Biceps tendon tear  3.  Subscapularis tear    Post-op Diagnosis:     1.  Right supraspinatus tear  2.  Full-thickness biceps tendon tear  3.  Partial-thickness subscapularis tear    Procedure(s):  1.  Right shoulder arthroscopic rotator cuff repair with anchor x1  2.  Biceps tenotomy  3.  Acromioplasty  4.  Sensitive debridement of labrum    Surgeon(s):  Jose Garcia MD    Anesthesia: General with Block  Anesthesiologist: Gilles Brewer MD  CRNA: Shayla Porter CRNA    Staff:   Circulator: Ember Williamson RN  Scrub Person: Emilia Clement; Amber Mcbride  Assistant: Sadie Hartley APRN      Drains: None    Estimated Blood Loss: minimal    Specimen: * No orders in the log *    Description of Procedure:   1.  Induction of anesthesia the patient was placed in the beachchair position.  The right shoulder was prepped and draped in a sterile fashion.  I injected the joint and created the posterior portal and inserted the cannula into the joint.  She was found to have high-grade tear of the supraspinatus and a near full-thickness tear of the biceps tendon.  I created the anterior portal and inserted the shaver.  I debrided the undersurface of the supraspinatus.  There was a full-thickness tear with minimal retraction of the supraspinatus.  Was able to visualize the subscapularis and found that it appeared to be only partially torn with some of the superior fibers torn but the majority of the subscapularis was intact.  I used the shaver to perform a biceps tenotomy and to extensively debride the stump of the biceps and the labrum circumferentially.    2.  I then placed the camera in the subacromial space and created the lateral portal.  She was found to have a large amount of bursal thickening and large bony impinging lesion.  I used the thermal probe to release soft  tissue from the acromion and to release the coracoacromial ligament.  I then inserted the 4 mm bur and performed an acromioplasty removing approximately 7-8 mm of bone.    3.  I then debrided the atrophic rotator cuff tissue from the anterior supraspinatus which resulted in around a 2 cm full-thickness tear with minimal retraction.  I then inserted a single Biomet 2.9 mm juggernaut suture anchor into the lateral margin of the greater tuberosity.  I use the Arthrex suture passing device to pass 1 limb of the suture through the lateral edge of the supraspinatus.  I tied the sutures with an Viveve sliding knot oversewn with a half hitch.  We achieve complete coverage of the tuberosity in anatomic repair of the rotator cuff.  I then placed the camera into the lateral portal to allow more direct visualization of the anterior humerus.  I placed a shaver into the anterior portal and performed a debridement which allowed better visualization of the subscapularis.  I did find the subscapularis to be intact.  We then removed the cannulas and closed the portals with a 3-0 nylon.  She was then placed into a soft sterile dressing and her postop sling.  She will be discharged to recovery and then to home and her prognosis is good    Jasmine Ambriz NP assisted me in this procedure.  Her presence was necessary to help with holding the patient's limb and the camera.  She allowed me to perform the procedure in a much quicker fashion resulting in less morbidity and risk for the patient.  An extra set of skilled sugical hands was necessary to effectively perform this procedure.        Findings: See Dictation    Complications: None      Jose Garcia MD     Date: 10/20/2021  Time: 11:49 EDT

## 2021-10-20 NOTE — ANESTHESIA PROCEDURE NOTES
Airway  Urgency: elective    Date/Time: 10/20/2021 11:06 AM  Airway not difficult    General Information and Staff    Patient location during procedure: OR  Anesthesiologist: Gilles Brewer MD  CRNA: Shayla Porter CRNA    Indications and Patient Condition  Indications for airway management: airway protection    Preoxygenated: yes  MILS not maintained throughout  Mask difficulty assessment: 1 - vent by mask    Final Airway Details  Final airway type: endotracheal airway      Successful airway: ETT  Cuffed: yes   Successful intubation technique: direct laryngoscopy  Facilitating devices/methods: intubating stylet  Endotracheal tube insertion site: oral  Blade: Joe  Blade size: 3  ETT size (mm): 7.0  Cormack-Lehane Classification: grade I - full view of glottis  Placement verified by: chest auscultation   Cuff volume (mL): 8  Measured from: lips  Number of attempts at approach: 1  Assessment: lips, teeth, and gum same as pre-op and atraumatic intubation    Additional Comments  PreO2 100% face mask, IV induction, easy mask, DVL x1, cords noted, tube through, cuff up, EBBSH, +etCO2, = chest movement, tube secured in place, atraumatic, teeth and lips intact as preop.

## 2021-10-20 NOTE — ANESTHESIA PROCEDURE NOTES
Peripheral Block    Pre-sedation assessment completed: 10/20/2021 9:29 AM    Patient reassessed immediately prior to procedure    Patient location during procedure: holding area  Start time: 10/20/2021 9:29 AM  Stop time: 10/20/2021 9:37 AM  Reason for block: at surgeon's request and post-op pain management  Performed by  Anesthesiologist: Gilles Brewer MD  Preanesthetic Checklist  Completed: patient identified, IV checked, site marked, risks and benefits discussed, surgical consent, monitors and equipment checked, pre-op evaluation and timeout performed  Prep:  Pt Position: supine  Sterile barriers:cap, gloves, mask and washed/disinfected hands  Prep: ChloraPrep  Patient monitoring: blood pressure monitoring, continuous pulse oximetry and EKG  Procedure    Sedation: yes  Performed under: local infiltration  Guidance:ultrasound guided    ULTRASOUND INTERPRETATION.  Using ultrasound guidance a 22 G (22G needle for placement of 3cc 2%lido to site prior to start of procedure.) gauge needle was placed in close proximity to the brachial plexus nerve, at which point, under ultrasound guidance anesthetic was injected in the area of the nerve and spread of the anesthesia was seen on ultrasound in close proximity thereto.  There were no abnormalities seen on ultrasound; a digital image was taken; and the patient tolerated the procedure with no complications. Images:still images obtained, printed/placed on chart    Laterality:right  Block Type:interscalene  Injection Technique:single-shot  Needle Type:echogenic  Needle Gauge:22 G      Medications Used: ropivacaine (NAROPIN) 0.5 % injection, 25 mL  lidocaine-EPINEPHrine (XYLOCAINE W/EPI) 2 %-1:[id number] injection, 5 mL  methylPREDNISolone acetate (DEPO-medrol) injection, 40 mg  Med administered at 10/20/2021 9:37 AM      Post Assessment  Injection Assessment: negative aspiration for heme, no paresthesia on injection and incremental injection  Patient Tolerance:comfortable  throughout block  Complications:no  Additional Notes  Ultrasound guidance was used to view needle placement and verify medication disbursement for this block.

## 2021-10-20 NOTE — ANESTHESIA PREPROCEDURE EVALUATION
Anesthesia Evaluation     Patient summary reviewed and Nursing notes reviewed   no history of anesthetic complications:  NPO Solid Status: > 8 hours  NPO Liquid Status: > 2 hours           Airway   Mallampati: II  TM distance: >3 FB  Neck ROM: full  no difficulty expected  Dental - normal exam     Pulmonary - normal exam   (+) asthma,  (-) COPD, not a smoker, lung cancer  Cardiovascular - normal exam  Exercise tolerance: good (4-7 METS)    ECG reviewed  Patient on routine beta blocker and Beta blocker given within 24 hours of surgery  Rhythm: regular  Rate: normal    (+) hypertension well controlled 2 medications or greater, hyperlipidemia,   (-) valvular problems/murmurs, past MI, CAD, dysrhythmias, angina, CHF, cardiac stents, CABG      Neuro/Psych- negative ROS  (-) seizures, TIA, CVA  GI/Hepatic/Renal/Endo    (+)  GERD well controlled,    (-) hiatal hernia, PUD, hepatitis, liver disease, no renal disease, diabetes, GI bleed, no thyroid disorder    Musculoskeletal (-) negative ROS    Abdominal  - normal exam   Substance History - negative use     OB/GYN negative ob/gyn ROS         Other - negative ROS                       Anesthesia Plan    ASA 2     general with block   (GA w/ ISB for POPC)  intravenous induction     Anesthetic plan, all risks, benefits, and alternatives have been provided, discussed and informed consent has been obtained with: patient.    Plan discussed with CRNA.

## 2021-10-26 RX ORDER — OXYCODONE HYDROCHLORIDE AND ACETAMINOPHEN 5; 325 MG/1; MG/1
1 TABLET ORAL EVERY 4 HOURS PRN
Qty: 40 TABLET | Refills: 0 | Status: CANCELLED | OUTPATIENT
Start: 2021-10-26

## 2021-11-02 ENCOUNTER — TELEPHONE (OUTPATIENT)
Dept: UROLOGY | Facility: CLINIC | Age: 61
End: 2021-11-02

## 2021-11-02 NOTE — TELEPHONE ENCOUNTER
Left message for patient to call me back.  Need to see if she is going to do her lab for follow up.

## 2021-11-03 RX ORDER — OXYCODONE HYDROCHLORIDE AND ACETAMINOPHEN 5; 325 MG/1; MG/1
1 TABLET ORAL EVERY 4 HOURS PRN
Qty: 40 TABLET | Refills: 0 | Status: CANCELLED | OUTPATIENT
Start: 2021-10-26

## 2022-06-22 NOTE — DISCHARGE INSTRUCTIONS
What to expect after a Nerve Block    Nerve blocks administered to block pain affect many types of nerves, including those nerves that control movement, pain, and normal sensation. Following a nerve block, you may notice some bruising at the site where the block was given. You may experience sensations such as: numbness of the affected area or limb, tingling, heaviness (that is the limb feels heavy to you), weakness or inability to move the affected arm or leg, or a feeling as if your arm or leg has “fallen asleep.”     A nerve block can last from 2 to 36 hours depending on the medications used.  Usually the weakness wears off first followed by the tingling and heaviness. As the block wears off, you may begin to notice pain; however, this sequence of events may occur in any order. Typically, you will be able to move your limb before you will feel it. Once a nerve block begins to wear off, the effects are usually completely gone within 60 minutes.  If you experience continued side effects that you believe are block related for longer than 48 hours, please call your healthcare provider. Please see block-specific instructions below.    Instructions for any block involving the shoulder or arm  • If you have had any kind of shoulder/arm block, you will go home with your arm in a sling. Wear the sling until the block has completely worn off. You may be required to wear it for a longer period of time per your surgeon’s recommendations.  • If you have had a shoulder/arm block, it is a good idea to sleep on a recliner with pillows under your arm.    You may experience symptoms such as:  Shortness of breath  Hoarseness   Blurry vision  Unequal pupils  Drooping of your face on the same side as the block was performed    These are side effects associated with this kind of block and should go away within 12 hours.    Note: If you have severe or prolonged shortness of breath, please seek medical assistance as soon as  possible.     Protection of a “blocked” arm or leg (limb)  • After a nerve block, you cannot feel pain, pressure, or extremes of temperature in the affected limb. And because of this, your blocked limb is at more risk for injury. For example, it is possible to burn your limb on an extremely hot surface without feeling it.     • When resting, it is important to reposition your limb periodically to avoid prolonged pressure on it. This may require the use of pillows and padding.    • While sleeping, you should avoid rolling onto the affected limb or putting too much pressure on it.     • If you have a cast or tight dressing, check the color of your fingers or toes of the affected limb. Call your surgeon if they look discolored (that is, dusky, dark colored).    • Use caution in cold weather. Cover your limb appropriately to protect it from the cold.      Pain Management:    Your surgeon will give you a prescription for pain medication. Begin taking this before the nerve block wears off. Bear in mind that sometimes the block can wear off in the middle of the night.     Pendulum Exercises for Post Op Shoulder Surgery      1. Lean over with your good arm supported on a table or chair.  2. Relax the injured arm and allow it to hang straight down at your side.  3. Slowly begin to swing the relaxed arm back and forth.  Then swing it side to side.  Next, move it in a [place]. Now,  reverse the direction.        Generally, you should spend about 5 minutes doing this exercise.  It should be done 2-3 times daily or as directed by your physician.       [Feeling Tired] : feeling tired [SOB on Exertion] : shortness of breath during exertion [As Noted in HPI] : as noted in HPI [Anxiety] : anxiety [Depression] : depression [Negative] : Neurological [FreeTextEntry5] : atrial fibrillation on Eliquis

## 2023-01-25 ENCOUNTER — TELEPHONE (OUTPATIENT)
Dept: ORTHOPEDIC SURGERY | Facility: CLINIC | Age: 63
End: 2023-01-25
Payer: COMMERCIAL

## 2023-01-25 ENCOUNTER — OFFICE VISIT (OUTPATIENT)
Dept: ORTHOPEDIC SURGERY | Facility: CLINIC | Age: 63
End: 2023-01-25
Payer: COMMERCIAL

## 2023-01-25 VITALS — HEIGHT: 63 IN | BODY MASS INDEX: 25.73 KG/M2 | TEMPERATURE: 96.4 F | WEIGHT: 145.2 LBS

## 2023-01-25 DIAGNOSIS — R52 PAIN: Primary | ICD-10-CM

## 2023-01-25 DIAGNOSIS — M17.12 PRIMARY OSTEOARTHRITIS OF LEFT KNEE: ICD-10-CM

## 2023-01-25 PROCEDURE — 73562 X-RAY EXAM OF KNEE 3: CPT | Performed by: ORTHOPAEDIC SURGERY

## 2023-01-25 PROCEDURE — 99203 OFFICE O/P NEW LOW 30 MIN: CPT | Performed by: ORTHOPAEDIC SURGERY

## 2023-01-25 RX ORDER — MAGNESIUM GLUCONATE 27 MG(500)
1500 TABLET ORAL 2 TIMES DAILY
COMMUNITY

## 2023-01-25 RX ORDER — LEVOFLOXACIN 750 MG/1
750 TABLET ORAL DAILY
COMMUNITY
Start: 2022-11-28

## 2023-01-25 RX ORDER — HYDROXYZINE HYDROCHLORIDE 25 MG/1
TABLET, FILM COATED ORAL
COMMUNITY
Start: 2022-08-24

## 2023-01-25 RX ORDER — AZITHROMYCIN 250 MG/1
TABLET, FILM COATED ORAL SEE ADMIN INSTRUCTIONS
COMMUNITY
Start: 2022-10-21

## 2023-01-25 RX ORDER — ALBUTEROL SULFATE 90 UG/1
AEROSOL, METERED RESPIRATORY (INHALATION)
COMMUNITY
Start: 2022-10-21

## 2023-01-25 RX ORDER — HYDROCODONE BITARTRATE AND ACETAMINOPHEN 5; 325 MG/1; MG/1
1 TABLET ORAL
COMMUNITY

## 2023-01-25 RX ORDER — CHLORAL HYDRATE 500 MG
1000 CAPSULE ORAL
COMMUNITY

## 2023-01-25 NOTE — PROGRESS NOTES
"Patient Name: Vaishali Castañeda   YOB: 1960  Referring Primary Care Physician: Ariana Ibarra APRN  BMI: Body mass index is 25.72 kg/m².    Chief Complaint:    Chief Complaint   Patient presents with   • Left Knee - Initial Evaluation        HPI:     Vaishali Castañeda is a 62 y.o. female who presents today for evaluation of   Chief Complaint   Patient presents with   • Left Knee - Initial Evaluation   .  Maddison is seen today complaining of left knee pain.  She has marked crush his sister who actually helped him get over his hip replacement he is doing well with it.  Reports that \"her knee is always popped\" but is been bothering her more.  She said that she had had an MRSA spine infection in the past from unknown causes in November 2020.  She says she had surgery antibiotics etc. and sees an ID doctor every 6 months who placed her on Keflex prophylaxis.  Said that she was developing bronchitis and pneumonia symptoms and was placed on Levaquin in November 2022.  She began having some knee pain at that time.  She read the side effects of the medicine stopped taking it because it said it could cause ligament problems and she just want to make sure they were doing all right.  Says she gets up to \"8 out of 10 pain\" with that she denies any fevers chills etc. in her knee.  She lives at [facility]      Subjective   Medications:   Home Medications:  Current Outpatient Medications on File Prior to Visit   Medication Sig   • albuterol sulfate  (90 Base) MCG/ACT inhaler INHALE 2 PUFFS EVERY 4 TO 6 HOURS AS NEEDED FOR SHORTNESS OF AIR / WHEEZING   • atorvastatin (LIPITOR) 10 MG tablet Take 10 mg by mouth Every Night.   • cephalexin (KEFLEX) 500 MG capsule Take 500 mg by mouth Daily. FOR STAFF INFECTION   • Cholecalciferol (Vitamin D-3) 25 MCG (1000 UT) capsule Take 1,000 Units by mouth Daily. HOLD FOR SURGERY   • gabapentin (NEURONTIN) 300 MG capsule Take 300 mg by mouth Every Night.   • lisinopril " (PRINIVIL,ZESTRIL) 10 MG tablet Take 10 mg by mouth Daily.   • magnesium gluconate (MAGONATE) 500 MG tablet Take 1,500 mg by mouth 2 (Two) Times a Day.   • metoprolol succinate XL (TOPROL-XL) 50 MG 24 hr tablet Take 50 mg by mouth Daily.   • montelukast (SINGULAIR) 10 MG tablet Take 10 mg by mouth Every Night.   • Omega-3 Fatty Acids (fish oil) 1000 MG capsule capsule Take 1,000 mg by mouth Daily With Breakfast.   • pantoprazole (PROTONIX) 40 MG EC tablet Take 40 mg by mouth Every Night.   • azithromycin (ZITHROMAX) 250 MG tablet Take  by mouth See Admin Instructions. Take 2 tablets by mouth on day 1 then take 1 tablet by mouth once daily on days 2-5   • folic acid (FOLVITE) 1 MG tablet Take 1,000 mcg by mouth Daily. HOLD FOR SURGERY   • HYDROcodone-acetaminophen (NORCO) 5-325 MG per tablet Take 1 tablet by mouth.   • hydrOXYzine (ATARAX) 25 MG tablet    • levoFLOXacin (LEVAQUIN) 750 MG tablet Take 750 mg by mouth Daily.   • melatonin 3 MG tablet Take 6 mg by mouth At Night As Needed. HOLD FOR SURGERY   • oxyCODONE-acetaminophen (PERCOCET) 5-325 MG per tablet Take 1 to 2 tablet by mouth Every 4 (Four) Hours As Needed for pain.     No current facility-administered medications on file prior to visit.     Current Medications:  Scheduled Meds:  Continuous Infusions:No current facility-administered medications for this visit.    PRN Meds:.    I have reviewed the patient's medical history in detail and updated the computerized patient record.  Review and summarization of old records includes:    Past Medical History:   Diagnosis Date   • Asthma     EXERCISE INDUCED ASTHMA   • Biceps tendonosis of right shoulder    • GERD (gastroesophageal reflux disease)    • Hyperlipidemia    • Hypertension    • Rotator cuff tear     RIGHT   • Staph infection 11/01/2020    IN SPINE AND [facility]. HOSPITALIZED FOR OVER A MONTH. [facility]        Past Surgical History:   Procedure Laterality Date   • COLONOSCOPY      • FOOT SURGERY      FUSED TOES AND BONE SPURS   • LUMBAR FUSION  2020   • SHOULDER ARTHROSCOPY W/ ROTATOR CUFF REPAIR Right 10/20/2021    Procedure: ARTHROSCOPYOF THE RIGHT SHOULDER FOR, ROTATOR CUFF REPAIR, SUBSCAPULARIS REPAIR AND ACROMIOPLASTY, extensive debridement;  Surgeon: Jose Garcia MD;  [place] [facility] [place] [facility] [place]: [place]opedics;  Laterality: Right;   • SINUS SURGERY          Social History     Occupational History   • Not on file   Tobacco Use   • Smoking status: Never   • Smokeless tobacco: Never   Vaping Use   • Vaping Use: Never used   Substance and Sexual Activity   • Alcohol use: Yes     Comment: 1 drink daily   • Drug use: Never   • Sexual activity: Defer      Social History     Social History Narrative   • Not on file        Family History   Problem Relation Age of Onset   • Breast cancer Sister    • Malig Hyperthermia Neg Hx        ROS: 14 point review of systems was performed and all other systems were reviewed and are negative except for documented findings in HPI and today's encounter.     Allergies:   Allergies   Allergen Reactions   • Sulfa Antibiotics Nausea Only     Constitutional:  Denies fever, shaking or chills   Eyes:  Denies change in visual acuity   HENT:  Denies nasal congestion or sore throat   Respiratory:  Denies cough or shortness of breath   Cardiovascular:  Denies chest pain or severe LE edema   GI:  Denies abdominal pain, nausea, vomiting, bloody stools or diarrhea   Musculoskeletal:  Numbness, tingling, pain, or loss of motor function only as noted above in history of present illness.  : Denies painful urination or hematuria  Integument:  Denies rash, lesion or ulceration   Neurologic:  Denies headache or focal weakness  Endocrine:  Denies lymphadenopathy  Psych:  Denies confusion or change in mental status   Hem:  Denies active bleeding    OBJECTIVE:  Physical Exam: 62 y.o. female  Wt Readings from Last 3 Encounters:   01/25/23 65.9 kg (145 lb 3.2 oz)  "  10/18/21 58.5 kg (129 lb)   09/22/21 58.4 kg (128 lb 12.8 oz)     Ht Readings from Last 1 Encounters:   01/25/23 160 cm (63\")     Body mass index is 25.72 kg/m².  Vitals:    01/25/23 0935   Temp: 96.4 °F (35.8 °C)     Vital signs reviewed.     General Appearance:    Alert, cooperative, in no acute distress                  Eyes: conjunctiva clear  ENT: external ears and nose atraumatic  CV: no peripheral edema  Resp: normal respiratory effort  Skin: no rashes or wounds; normal turgor  Psych: mood and affect appropriate  Lymph: no nodes appreciated  Neuro: gross sensation intact  Vascular:  Palpable peripheral pulse in noted extremity  Musculoskeletal Extremities: Todd today's pleasant lady she has little bit of crepitation in her knee she has medial joint line tenderness Grey's negative on today's exam but she has diffuse peripatellar tenderness there is no gaps or defects or tenderness over quad or patellar tendons or ligaments feel stable    Radiology:   P lateral 40 degree PA x-ray left knee taken the office today for complaints of pain without comparison views available show evidence of arthritis.        Assessment:     ICD-10-CM ICD-9-CM   1. Pain  R52 780.96   2. Primary osteoarthritis of left knee  M17.12 715.16        MDM/Plan:   The diagnosis(es), natural history, pathophysiology and treatment for diagnosis(es) were discussed. Opportunity given and questions answered.  Biomechanics of pertinent body areas discussed.  When appropriate, the use of ambulatory aids discussed.    Biomechanics of pertinent body areas discussed.  When appropriate, the use of ambulatory aids discussed.  BMI:  The concept of BMI body mass index and its importance and implications discussed.    MEDICATIONS:  The risks, benefits, warnings,side effects and alternatives of medications discussed.  Inflammation/pain control; with cold, heat, elevation and/or liniments discussed as appropriate  CONSULT: This Consult is done at the " request of a requesting provider to whom I will send this report with this rendered opinion.  MEDICAL RECORDS reviewed from other provider(s) for past and current medical history pertinent to this complaint.      1/25/2023    Dictated utilizing Dragon dictation

## 2023-01-27 ENCOUNTER — PATIENT ROUNDING (BHMG ONLY) (OUTPATIENT)
Dept: ORTHOPEDIC SURGERY | Facility: CLINIC | Age: 63
End: 2023-01-27
Payer: COMMERCIAL

## 2023-01-27 NOTE — PROGRESS NOTES
A [employer] Message has been sent to the patient for PATIENT ROUNDING with [facility]

## 2024-02-21 ENCOUNTER — OFFICE VISIT (OUTPATIENT)
Dept: INTERNAL MEDICINE | Facility: CLINIC | Age: 64
End: 2024-02-21
Payer: MEDICARE

## 2024-02-21 VITALS
HEART RATE: 80 BPM | SYSTOLIC BLOOD PRESSURE: 162 MMHG | BODY MASS INDEX: 26.05 KG/M2 | OXYGEN SATURATION: 95 % | TEMPERATURE: 97.1 F | WEIGHT: 147 LBS | DIASTOLIC BLOOD PRESSURE: 92 MMHG | HEIGHT: 63 IN

## 2024-02-21 DIAGNOSIS — Z00.00 ANNUAL PHYSICAL EXAM: Primary | ICD-10-CM

## 2024-02-21 DIAGNOSIS — R01.1 HEART MURMUR: ICD-10-CM

## 2024-02-21 DIAGNOSIS — N39.0 RECURRENT URINARY TRACT INFECTION: ICD-10-CM

## 2024-02-21 DIAGNOSIS — E55.9 VITAMIN D DEFICIENCY: ICD-10-CM

## 2024-02-21 DIAGNOSIS — E78.2 MIXED HYPERLIPIDEMIA: ICD-10-CM

## 2024-02-21 DIAGNOSIS — R31.29 MICROHEMATURIA: ICD-10-CM

## 2024-02-21 DIAGNOSIS — M46.47 DISCITIS OF LUMBOSACRAL REGION: ICD-10-CM

## 2024-02-21 NOTE — PROGRESS NOTES
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  Review of Systems   Constitutional: Negative.    HENT: Negative.     Eyes: Negative.    Respiratory: Negative.     Cardiovascular: Negative.    Gastrointestinal: Negative.    Endocrine: Negative.    Genitourinary: Negative.    Musculoskeletal: Negative.    Allergic/Immunologic: Negative.    Neurological: Negative.    Hematological: Negative.    Psychiatric/Behavioral: Negative.        Physical Exam  Constitutional:       General: She is not in acute distress.     Appearance: Normal appearance.   HENT:      Head: Normocephalic and atraumatic.      Right Ear: Tympanic membrane and ear canal normal.      Left Ear: Tympanic membrane and ear canal normal.      Mouth/Throat:      Mouth: Mucous membranes are moist.      Pharynx: Oropharynx is clear.   Eyes:      General: No scleral icterus.     Extraocular Movements: Extraocular movements intact.      Conjunctiva/sclera: Conjunctivae normal.      Pupils: Pupils are equal, round, and reactive to light.   Neck:      Vascular: No carotid bruit.   Cardiovascular:      Rate and Rhythm: Normal rate and regular rhythm.      Pulses: Normal pulses.      Heart sounds: Normal heart sounds. No murmur heard.     No gallop.   Pulmonary:      Effort: Pulmonary effort is normal. No respiratory distress.      Breath sounds: Normal breath sounds. No wheezing.   Abdominal:      " "General: Bowel sounds are normal. There is no distension.      Palpations: Abdomen is soft. There is no mass.      Tenderness: There is no abdominal tenderness. There is no guarding.   Musculoskeletal:         General: No swelling or tenderness. Normal range of motion.      Cervical back: Normal range of motion and neck supple. No tenderness.      Right lower leg: No edema.      Left lower leg: No edema.   Lymphadenopathy:      Cervical: No cervical adenopathy.   Skin:     General: Skin is warm and dry.      Coloration: Skin is not jaundiced.      Findings: No rash.   Neurological:      General: No focal deficit present.      Mental Status: She is alert and oriented to person, place, and time.      Motor: No weakness.      Coordination: Coordination normal.      Gait: Gait normal.   Psychiatric:         Mood and Affect: Mood normal.         Behavior: Behavior normal.         Thought Content: Thought content normal.         Judgment: Judgment normal.     Heart murmur, she has a 2/6 systolic murmur right upper sternal border,    Result Review :   No results found for: \"PROBNP\", \"BNP\"    CBC w/diff          8/18/2023    11:24   CBC w/Diff   WBC 5.94       RBC 4.03       Hemoglobin 13.0       Hematocrit 38.8       MCV 96.3       MCH 32.3       MCHC 33.5       RDW 11.1       Platelets 220       Neutrophil Rel % 43.3       Immature Granulocyte Rel % 0.7       Lymphocyte Rel % 34.3       Monocyte Rel % 15.3       Eosinophil Rel % 5.7       Basophil Rel % 0.7          Details          This result is from an external source.                 No results found for: \"TSH\"   Lab Results   Component Value Date    FREET4 1.50 10/30/2020                          Visit Diagnoses:    ICD-10-CM ICD-9-CM   1. Annual physical exam  Z00.00 V70.0   2. Recurrent urinary tract infection  N39.0 599.0   3. Microhematuria  R31.29 599.72   4. Discitis of lumbosacral region  M46.47 722.93   5. Heart murmur  R01.1 785.2   6. Mixed hyperlipidemia  " E78.2 272.2   7. Vitamin D deficiency  E55.9 268.9       Assessment and Plan   Diagnoses and all orders for this visit:    1. Annual physical exam (Primary)  -     CBC & Differential; Future  -     Comprehensive Metabolic Panel; Future  -     Lipid Panel; Future  -     Magnesium; Future  -     TSH+Free T4; Future  -     Vitamin B12 anemia; Future  -     Folate anemia; Future  -     Vitamin D,25-Hydroxy; Future  -     Sedimentation Rate; Future  -     Adult Transthoracic Echo Complete W/ Cont if Necessary Per Protocol; Future    2. Recurrent urinary tract infection  -     CBC & Differential; Future  -     Comprehensive Metabolic Panel; Future  -     Lipid Panel; Future  -     Magnesium; Future  -     TSH+Free T4; Future  -     Vitamin B12 anemia; Future  -     Folate anemia; Future  -     Vitamin D,25-Hydroxy; Future  -     Sedimentation Rate; Future  -     Adult Transthoracic Echo Complete W/ Cont if Necessary Per Protocol; Future    3. Microhematuria  -     CBC & Differential; Future  -     Comprehensive Metabolic Panel; Future  -     Lipid Panel; Future  -     Magnesium; Future  -     TSH+Free T4; Future  -     Vitamin B12 anemia; Future  -     Folate anemia; Future  -     Vitamin D,25-Hydroxy; Future  -     Sedimentation Rate; Future  -     Adult Transthoracic Echo Complete W/ Cont if Necessary Per Protocol; Future    4. Discitis of lumbosacral region  -     CBC & Differential; Future  -     Comprehensive Metabolic Panel; Future  -     Lipid Panel; Future  -     Magnesium; Future  -     TSH+Free T4; Future  -     Vitamin B12 anemia; Future  -     Folate anemia; Future  -     Vitamin D,25-Hydroxy; Future  -     Sedimentation Rate; Future  -     Adult Transthoracic Echo Complete W/ Cont if Necessary Per Protocol; Future    5. Heart murmur  -     CBC & Differential; Future  -     Comprehensive Metabolic Panel; Future  -     Lipid Panel; Future  -     Magnesium; Future  -     TSH+Free T4; Future  -     Vitamin B12  anemia; Future  -     Folate anemia; Future  -     Vitamin D,25-Hydroxy; Future  -     Sedimentation Rate; Future  -     Adult Transthoracic Echo Complete W/ Cont if Necessary Per Protocol; Future    6. Mixed hyperlipidemia  -     CBC & Differential; Future  -     Comprehensive Metabolic Panel; Future  -     Lipid Panel; Future  -     Magnesium; Future  -     TSH+Free T4; Future  -     Vitamin B12 anemia; Future  -     Folate anemia; Future  -     Vitamin D,25-Hydroxy; Future  -     Sedimentation Rate; Future  -     Adult Transthoracic Echo Complete W/ Cont if Necessary Per Protocol; Future    7. Vitamin D deficiency  -     CBC & Differential; Future  -     Comprehensive Metabolic Panel; Future  -     Lipid Panel; Future  -     Magnesium; Future  -     TSH+Free T4; Future  -     Vitamin B12 anemia; Future  -     Folate anemia; Future  -     Vitamin D,25-Hydroxy; Future  -     Sedimentation Rate; Future  -     Adult Transthoracic Echo Complete W/ Cont if Necessary Per Protocol; Future    Dizziness, --? Tia, went to er and CTA, CT BRAIN --, December 2023, showed normal CT of the brain no stroke, CT angiogram showed a 80% stenosis of the external carotid artery otherwise minimal plaquing noted of the internal carotid arteries and common carotid arteries,    BMI is >= 25 and <30. (Overweight) The following options were offered after discussion;: weight loss educational material (shared in after visit summary), exercise counseling/recommendations, and nutrition counseling/recommendations     Back surgery  2020 [facility] [place] --for orif-    Heart murmur aortic valve, will check echo,    Discitis--lumbar 2020-- sepsis--in hospital x 10 days with abx, subsequent transferred to [facility] spine [facility], ended up having back surgery, for discitis with rods, hardware,--- patient was placed on Keflex 500 mg daily for life per infectious disease Dr. Choudhury in [place], she had MSSA staph growing from the  cultures,    Bilateral screening mammogram October 19, 2023 normal    Colonoscopy 2022--    Ct abd/ pelvis --sept 2023, normal     Hypomagnesemia    FOOT SURGERY /right shoulder arthroplasty,    CAUTIONED ON ETOH USE ---     Follow Up   Return in about 6 weeks (around 4/3/2024).  Patient was given instructions and counseling regarding her condition or for health maintenance advice. Please see specific information pulled into the AVS if appropriate.

## 2024-02-27 ENCOUNTER — PATIENT ROUNDING (BHMG ONLY) (OUTPATIENT)
Dept: INTERNAL MEDICINE | Facility: CLINIC | Age: 64
End: 2024-02-27
Payer: MEDICARE

## 2024-03-11 RX ORDER — METOPROLOL SUCCINATE 50 MG/1
50 TABLET, EXTENDED RELEASE ORAL DAILY
Qty: 90 TABLET | Refills: 1 | Status: SHIPPED | OUTPATIENT
Start: 2024-03-11

## 2024-03-11 RX ORDER — MONTELUKAST SODIUM 10 MG/1
10 TABLET ORAL DAILY
Qty: 90 TABLET | Refills: 1 | Status: SHIPPED | OUTPATIENT
Start: 2024-03-11

## 2024-03-11 RX ORDER — HYDROXYZINE HYDROCHLORIDE 10 MG/1
10 TABLET, FILM COATED ORAL DAILY PRN
Qty: 90 TABLET | Refills: 1 | Status: SHIPPED | OUTPATIENT
Start: 2024-03-11

## 2024-03-11 RX ORDER — ATORVASTATIN CALCIUM 20 MG/1
20 TABLET, FILM COATED ORAL DAILY
Qty: 90 TABLET | Refills: 1 | Status: SHIPPED | OUTPATIENT
Start: 2024-03-11

## 2024-03-11 RX ORDER — LISINOPRIL 10 MG/1
10 TABLET ORAL DAILY
Qty: 90 TABLET | Refills: 1 | Status: SHIPPED | OUTPATIENT
Start: 2024-03-11

## 2024-03-11 RX ORDER — SERTRALINE HYDROCHLORIDE 25 MG/1
50 TABLET, FILM COATED ORAL DAILY
Qty: 180 TABLET | Refills: 1 | Status: SHIPPED | OUTPATIENT
Start: 2024-03-11

## 2024-04-19 ENCOUNTER — TELEPHONE (OUTPATIENT)
Dept: CARDIOVASCULAR ICU | Facility: HOSPITAL | Age: 64
End: 2024-04-19
Payer: MEDICARE

## 2024-04-19 ENCOUNTER — HOSPITAL ENCOUNTER (OUTPATIENT)
Dept: CARDIOLOGY | Facility: HOSPITAL | Age: 64
Discharge: HOME | End: 2024-04-19
Payer: MEDICARE

## 2024-04-19 DIAGNOSIS — E78.2 MIXED HYPERLIPIDEMIA: ICD-10-CM

## 2024-04-19 DIAGNOSIS — Z00.00 ANNUAL PHYSICAL EXAM: ICD-10-CM

## 2024-04-19 DIAGNOSIS — R01.1 HEART MURMUR: ICD-10-CM

## 2024-04-19 DIAGNOSIS — E55.9 VITAMIN D DEFICIENCY: ICD-10-CM

## 2024-04-19 DIAGNOSIS — M46.47 DISCITIS OF LUMBOSACRAL REGION: ICD-10-CM

## 2024-04-19 DIAGNOSIS — R31.29 MICROHEMATURIA: ICD-10-CM

## 2024-04-19 DIAGNOSIS — N39.0 RECURRENT URINARY TRACT INFECTION: ICD-10-CM

## 2024-04-19 LAB
AORTIC DIMENSIONLESS INDEX: 0.5 (DI)
BH CV ECHO MEAS - ACS: 2.1 CM
BH CV ECHO MEAS - AI P1/2T: 815.1 MSEC
BH CV ECHO MEAS - AO MAX PG: 14.6 MMHG
BH CV ECHO MEAS - AO MEAN PG: 8 MMHG
BH CV ECHO MEAS - AO ROOT DIAM: 2.7 CM
BH CV ECHO MEAS - AO V2 MAX: 191 CM/SEC
BH CV ECHO MEAS - AO V2 VTI: 40.7 CM
BH CV ECHO MEAS - AVA(I,D): 1.74 CM2
BH CV ECHO MEAS - EDV(CUBED): 79.5 ML
BH CV ECHO MEAS - EDV(MOD-SP2): 55.4 ML
BH CV ECHO MEAS - EDV(MOD-SP4): 78.8 ML
BH CV ECHO MEAS - EF(MOD-BP): 63 %
BH CV ECHO MEAS - EF(MOD-SP2): 64.1 %
BH CV ECHO MEAS - EF(MOD-SP4): 61.2 %
BH CV ECHO MEAS - ESV(CUBED): 17.6 ML
BH CV ECHO MEAS - ESV(MOD-SP2): 19.9 ML
BH CV ECHO MEAS - ESV(MOD-SP4): 30.6 ML
BH CV ECHO MEAS - FS: 39.5 %
BH CV ECHO MEAS - IVS/LVPW: 1 CM
BH CV ECHO MEAS - IVSD: 1 CM
BH CV ECHO MEAS - LA DIMENSION: 3.6 CM
BH CV ECHO MEAS - LAT PEAK E' VEL: 9.7 CM/SEC
BH CV ECHO MEAS - LV DIASTOLIC VOL/BSA (35-75): 47 CM2
BH CV ECHO MEAS - LV MASS(C)D: 142.5 GRAMS
BH CV ECHO MEAS - LV MAX PG: 3.7 MMHG
BH CV ECHO MEAS - LV MEAN PG: 2 MMHG
BH CV ECHO MEAS - LV SYSTOLIC VOL/BSA (12-30): 18.2 CM2
BH CV ECHO MEAS - LV V1 MAX: 96.4 CM/SEC
BH CV ECHO MEAS - LV V1 VTI: 20.5 CM
BH CV ECHO MEAS - LVIDD: 4.3 CM
BH CV ECHO MEAS - LVIDS: 2.6 CM
BH CV ECHO MEAS - LVOT AREA: 3.5 CM2
BH CV ECHO MEAS - LVOT DIAM: 2.1 CM
BH CV ECHO MEAS - LVPWD: 1 CM
BH CV ECHO MEAS - MED PEAK E' VEL: 7 CM/SEC
BH CV ECHO MEAS - MR MAX PG: 94.5 MMHG
BH CV ECHO MEAS - MR MAX VEL: 486 CM/SEC
BH CV ECHO MEAS - MV A MAX VEL: 116 CM/SEC
BH CV ECHO MEAS - MV DEC SLOPE: 1338 CM/SEC2
BH CV ECHO MEAS - MV DEC TIME: 0.17 SEC
BH CV ECHO MEAS - MV E MAX VEL: 82.3 CM/SEC
BH CV ECHO MEAS - MV E/A: 0.71
BH CV ECHO MEAS - MV MEAN PG: 2 MMHG
BH CV ECHO MEAS - MV P1/2T: 29.3 MSEC
BH CV ECHO MEAS - MV V2 VTI: 34.3 CM
BH CV ECHO MEAS - MVA(P1/2T): 7.5 CM2
BH CV ECHO MEAS - MVA(VTI): 2.07 CM2
BH CV ECHO MEAS - PA V2 MAX: 89.8 CM/SEC
BH CV ECHO MEAS - PI END-D VEL: 69 CM/SEC
BH CV ECHO MEAS - PULM A REVS DUR: 0.14 SEC
BH CV ECHO MEAS - PULM A REVS VEL: 42.9 CM/SEC
BH CV ECHO MEAS - PULM DIAS VEL: 42.4 CM/SEC
BH CV ECHO MEAS - PULM S/D: 1.59
BH CV ECHO MEAS - PULM SYS VEL: 67.5 CM/SEC
BH CV ECHO MEAS - QP/QS: 0.81
BH CV ECHO MEAS - RAP SYSTOLE: 5 MMHG
BH CV ECHO MEAS - RV MAX PG: 2.4 MMHG
BH CV ECHO MEAS - RV V1 MAX: 77.4 CM/SEC
BH CV ECHO MEAS - RV V1 VTI: 18.3 CM
BH CV ECHO MEAS - RVDD: 3 CM
BH CV ECHO MEAS - RVOT DIAM: 2 CM
BH CV ECHO MEAS - RVSP: 29 MMHG
BH CV ECHO MEAS - SI(MOD-SP2): 21.2 ML/M2
BH CV ECHO MEAS - SI(MOD-SP4): 28.7 ML/M2
BH CV ECHO MEAS - SV(LVOT): 71 ML
BH CV ECHO MEAS - SV(MOD-SP2): 35.5 ML
BH CV ECHO MEAS - SV(MOD-SP4): 48.2 ML
BH CV ECHO MEAS - SV(RVOT): 57.5 ML
BH CV ECHO MEAS - TAPSE (>1.6): 2.01 CM
BH CV ECHO MEAS - TR MAX PG: 24 MMHG
BH CV ECHO MEAS - TR MAX VEL: 245 CM/SEC
BH CV ECHO MEASUREMENTS AVERAGE E/E' RATIO: 9.86
BH CV XLRA - TDI S': 12 CM/SEC
IVRT: 92 MS
LEFT ATRIUM VOLUME INDEX: 27.5 ML/M2

## 2024-04-19 PROCEDURE — 93306 TTE W/DOPPLER COMPLETE: CPT

## 2024-04-19 NOTE — TELEPHONE ENCOUNTER
CALL PT WITH ECHO RESULTS AND WILL GO OVER WITH HER AT NEXT VISIT,  SHE DOES HAVE SOME= MILD AORTIC STENOSIS

## 2024-04-23 ENCOUNTER — LAB (OUTPATIENT)
Dept: INTERNAL MEDICINE | Age: 64
End: 2024-04-23
Payer: MEDICARE

## 2024-04-23 DIAGNOSIS — E55.9 VITAMIN D DEFICIENCY: ICD-10-CM

## 2024-04-23 DIAGNOSIS — E78.2 MIXED HYPERLIPIDEMIA: ICD-10-CM

## 2024-04-23 DIAGNOSIS — R01.1 HEART MURMUR: ICD-10-CM

## 2024-04-23 DIAGNOSIS — Z00.00 ANNUAL PHYSICAL EXAM: ICD-10-CM

## 2024-04-23 DIAGNOSIS — M46.47 DISCITIS OF LUMBOSACRAL REGION: ICD-10-CM

## 2024-04-23 DIAGNOSIS — R31.29 MICROHEMATURIA: ICD-10-CM

## 2024-04-23 DIAGNOSIS — N39.0 RECURRENT URINARY TRACT INFECTION: ICD-10-CM

## 2024-04-23 LAB
25(OH)D3 SERPL-MCNC: 65 NG/ML (ref 30–100)
ALBUMIN SERPL-MCNC: 4.7 G/DL (ref 3.5–5.2)
ALBUMIN/GLOB SERPL: 2 G/DL
ALP SERPL-CCNC: 56 U/L (ref 39–117)
ALT SERPL W P-5'-P-CCNC: 23 U/L (ref 1–33)
ANION GAP SERPL CALCULATED.3IONS-SCNC: 12 MMOL/L (ref 5–15)
AST SERPL-CCNC: 36 U/L (ref 1–32)
BASOPHILS # BLD AUTO: 0.07 10*3/MM3 (ref 0–0.2)
BASOPHILS NFR BLD AUTO: 1 % (ref 0–1.5)
BILIRUB SERPL-MCNC: 0.5 MG/DL (ref 0–1.2)
BUN SERPL-MCNC: 10 MG/DL (ref 8–23)
BUN/CREAT SERPL: 16.9 (ref 7–25)
CALCIUM SPEC-SCNC: 9.5 MG/DL (ref 8.6–10.5)
CHLORIDE SERPL-SCNC: 100 MMOL/L (ref 98–107)
CHOLEST SERPL-MCNC: 269 MG/DL (ref 0–200)
CO2 SERPL-SCNC: 24 MMOL/L (ref 22–29)
CREAT SERPL-MCNC: 0.59 MG/DL (ref 0.57–1)
DEPRECATED RDW RBC AUTO: 42.1 FL (ref 37–54)
EGFRCR SERPLBLD CKD-EPI 2021: 100.2 ML/MIN/1.73
EOSINOPHIL # BLD AUTO: 0.61 10*3/MM3 (ref 0–0.4)
EOSINOPHIL NFR BLD AUTO: 8.5 % (ref 0.3–6.2)
ERYTHROCYTE [DISTWIDTH] IN BLOOD BY AUTOMATED COUNT: 11.7 % (ref 12.3–15.4)
ERYTHROCYTE [SEDIMENTATION RATE] IN BLOOD: 9 MM/HR (ref 0–30)
FOLATE SERPL-MCNC: 10.8 NG/ML (ref 4.78–24.2)
GLOBULIN UR ELPH-MCNC: 2.4 GM/DL
GLUCOSE SERPL-MCNC: 116 MG/DL (ref 65–99)
HCT VFR BLD AUTO: 38.3 % (ref 34–46.6)
HDLC SERPL-MCNC: 63 MG/DL (ref 40–60)
HGB BLD-MCNC: 12.9 G/DL (ref 12–15.9)
IMM GRANULOCYTES # BLD AUTO: 0.03 10*3/MM3 (ref 0–0.05)
IMM GRANULOCYTES NFR BLD AUTO: 0.4 % (ref 0–0.5)
LDLC SERPL CALC-MCNC: 167 MG/DL (ref 0–100)
LDLC/HDLC SERPL: 2.6 {RATIO}
LYMPHOCYTES # BLD AUTO: 2.25 10*3/MM3 (ref 0.7–3.1)
LYMPHOCYTES NFR BLD AUTO: 31.3 % (ref 19.6–45.3)
MAGNESIUM SERPL-MCNC: 1.4 MG/DL (ref 1.6–2.4)
MCH RBC QN AUTO: 32.6 PG (ref 26.6–33)
MCHC RBC AUTO-ENTMCNC: 33.7 G/DL (ref 31.5–35.7)
MCV RBC AUTO: 96.7 FL (ref 79–97)
MONOCYTES # BLD AUTO: 0.75 10*3/MM3 (ref 0.1–0.9)
MONOCYTES NFR BLD AUTO: 10.4 % (ref 5–12)
NEUTROPHILS NFR BLD AUTO: 3.49 10*3/MM3 (ref 1.7–7)
NEUTROPHILS NFR BLD AUTO: 48.4 % (ref 42.7–76)
NRBC BLD AUTO-RTO: 0 /100 WBC (ref 0–0.2)
PLATELET # BLD AUTO: 211 10*3/MM3 (ref 140–450)
PMV BLD AUTO: 11.9 FL (ref 6–12)
POTASSIUM SERPL-SCNC: 4.8 MMOL/L (ref 3.5–5.2)
PROT SERPL-MCNC: 7.1 G/DL (ref 6–8.5)
RBC # BLD AUTO: 3.96 10*6/MM3 (ref 3.77–5.28)
SODIUM SERPL-SCNC: 136 MMOL/L (ref 136–145)
T4 FREE SERPL-MCNC: 0.88 NG/DL (ref 0.93–1.7)
TRIGL SERPL-MCNC: 210 MG/DL (ref 0–150)
TSH SERPL DL<=0.05 MIU/L-ACNC: 3.32 UIU/ML (ref 0.27–4.2)
VIT B12 BLD-MCNC: 644 PG/ML (ref 211–946)
VLDLC SERPL-MCNC: 39 MG/DL (ref 5–40)
WBC NRBC COR # BLD AUTO: 7.2 10*3/MM3 (ref 3.4–10.8)

## 2024-04-23 PROCEDURE — 80061 LIPID PANEL: CPT | Performed by: INTERNAL MEDICINE

## 2024-04-23 PROCEDURE — 80053 COMPREHEN METABOLIC PANEL: CPT | Performed by: INTERNAL MEDICINE

## 2024-04-23 PROCEDURE — 82607 VITAMIN B-12: CPT | Performed by: INTERNAL MEDICINE

## 2024-04-23 PROCEDURE — 83735 ASSAY OF MAGNESIUM: CPT | Performed by: INTERNAL MEDICINE

## 2024-04-23 PROCEDURE — 36415 COLL VENOUS BLD VENIPUNCTURE: CPT | Performed by: INTERNAL MEDICINE

## 2024-04-23 PROCEDURE — 82306 VITAMIN D 25 HYDROXY: CPT | Performed by: INTERNAL MEDICINE

## 2024-04-23 PROCEDURE — 84439 ASSAY OF FREE THYROXINE: CPT | Performed by: INTERNAL MEDICINE

## 2024-04-23 PROCEDURE — 85652 RBC SED RATE AUTOMATED: CPT | Performed by: INTERNAL MEDICINE

## 2024-04-23 PROCEDURE — 82746 ASSAY OF FOLIC ACID SERUM: CPT | Performed by: INTERNAL MEDICINE

## 2024-04-23 PROCEDURE — 85025 COMPLETE CBC W/AUTO DIFF WBC: CPT | Performed by: INTERNAL MEDICINE

## 2024-04-23 PROCEDURE — 84443 ASSAY THYROID STIM HORMONE: CPT | Performed by: INTERNAL MEDICINE

## 2024-04-29 ENCOUNTER — OFFICE VISIT (OUTPATIENT)
Dept: INTERNAL MEDICINE | Age: 64
End: 2024-04-29
Payer: MEDICARE

## 2024-04-29 VITALS
DIASTOLIC BLOOD PRESSURE: 87 MMHG | OXYGEN SATURATION: 96 % | WEIGHT: 150 LBS | BODY MASS INDEX: 26.58 KG/M2 | HEART RATE: 73 BPM | HEIGHT: 63 IN | SYSTOLIC BLOOD PRESSURE: 149 MMHG | TEMPERATURE: 97.6 F

## 2024-04-29 DIAGNOSIS — M46.47 DISCITIS OF LUMBOSACRAL REGION: ICD-10-CM

## 2024-04-29 DIAGNOSIS — R74.8 ELEVATED LIVER ENZYMES: ICD-10-CM

## 2024-04-29 DIAGNOSIS — R01.1 HEART MURMUR: ICD-10-CM

## 2024-04-29 DIAGNOSIS — E55.9 VITAMIN D DEFICIENCY: ICD-10-CM

## 2024-04-29 DIAGNOSIS — R73.01 IFG (IMPAIRED FASTING GLUCOSE): ICD-10-CM

## 2024-04-29 DIAGNOSIS — Z11.59 ENCOUNTER FOR SCREENING FOR OTHER VIRAL DISEASES: ICD-10-CM

## 2024-04-29 DIAGNOSIS — E78.2 MIXED HYPERLIPIDEMIA: ICD-10-CM

## 2024-04-29 DIAGNOSIS — Z00.00 WELCOME TO MEDICARE PREVENTIVE VISIT: Primary | ICD-10-CM

## 2024-04-29 DIAGNOSIS — N39.0 RECURRENT URINARY TRACT INFECTION: ICD-10-CM

## 2024-04-29 PROCEDURE — 1170F FXNL STATUS ASSESSED: CPT | Performed by: INTERNAL MEDICINE

## 2024-04-29 PROCEDURE — G0439 PPPS, SUBSEQ VISIT: HCPCS | Performed by: INTERNAL MEDICINE

## 2024-04-29 PROCEDURE — 99214 OFFICE O/P EST MOD 30 MIN: CPT | Performed by: INTERNAL MEDICINE

## 2024-04-29 RX ORDER — PANTOPRAZOLE SODIUM 40 MG/1
40 TABLET, DELAYED RELEASE ORAL NIGHTLY
Qty: 90 TABLET | Refills: 3 | Status: SHIPPED | OUTPATIENT
Start: 2024-04-29

## 2024-04-29 RX ORDER — TRIAMCINOLONE ACETONIDE 1 MG/G
1 CREAM TOPICAL 2 TIMES DAILY
Qty: 60 G | Refills: 5 | Status: SHIPPED | OUTPATIENT
Start: 2024-04-29

## 2024-04-29 RX ORDER — MICONAZOLE NITRATE 20 MG/G
1 POWDER TOPICAL AS NEEDED
Qty: 1 EACH | Refills: 5 | Status: SHIPPED | OUTPATIENT
Start: 2024-04-29

## 2024-04-29 NOTE — PROGRESS NOTES
"CHIEF COMPLAINT/ HPI:  Rash (Routine follow up , Lab follow up. Rash on both arms and chest area. Nystatin powder request for under breast area. )    Here to follow-up with recent lab work, she says she is doing okay she is staying very active,          Objective   Vital Signs  Vitals:    04/29/24 1545   BP: 149/87   Pulse: 73   Temp: 97.6 °F (36.4 °C)   SpO2: 96%   Weight: 68 kg (150 lb)   Height: 160 cm (62.99\")      Body mass index is 26.58 kg/m².  Review of Systems   Constitutional: Negative.    HENT: Negative.     Eyes: Negative.    Respiratory: Negative.     Cardiovascular: Negative.    Gastrointestinal: Negative.    Endocrine: Negative.    Genitourinary: Negative.    Musculoskeletal: Negative.    Allergic/Immunologic: Negative.    Neurological: Negative.    Hematological: Negative.    Psychiatric/Behavioral: Negative.        Physical Exam  Constitutional:       General: She is not in acute distress.     Appearance: Normal appearance.   HENT:      Head: Normocephalic.      Mouth/Throat:      Mouth: Mucous membranes are moist.   Eyes:      Conjunctiva/sclera: Conjunctivae normal.      Pupils: Pupils are equal, round, and reactive to light.   Cardiovascular:      Rate and Rhythm: Normal rate and regular rhythm.      Pulses: Normal pulses.      Heart sounds: Normal heart sounds.   Pulmonary:      Effort: Pulmonary effort is normal.      Breath sounds: Normal breath sounds.   Abdominal:      General: Abdomen is flat. Bowel sounds are normal.      Palpations: Abdomen is soft.   Musculoskeletal:         General: No swelling. Normal range of motion.      Cervical back: Neck supple.   Skin:     General: Skin is warm and dry.      Coloration: Skin is not jaundiced.   Neurological:      General: No focal deficit present.      Mental Status: She is alert and oriented to person, place, and time. Mental status is at baseline.   Psychiatric:         Mood and Affect: Mood normal.         Behavior: Behavior normal.         " "Thought Content: Thought content normal.         Judgment: Judgment normal.        Result Review :   No results found for: \"PROBNP\", \"BNP\"  CMP          4/23/2024    08:41   CMP   Glucose 116    BUN 10    Creatinine 0.59    EGFR 100.2    Sodium 136    Potassium 4.8    Chloride 100    Calcium 9.5    Total Protein 7.1    Albumin 4.7    Globulin 2.4    Total Bilirubin 0.5    Alkaline Phosphatase 56    AST (SGOT) 36    ALT (SGPT) 23    Albumin/Globulin Ratio 2.0    BUN/Creatinine Ratio 16.9    Anion Gap 12.0      CBC w/diff          8/18/2023    11:24 4/23/2024    08:41   CBC w/Diff   WBC 5.94     7.20    RBC 4.03     3.96    Hemoglobin 13.0     12.9    Hematocrit 38.8     38.3    MCV 96.3     96.7    MCH 32.3     32.6    MCHC 33.5     33.7    RDW 11.1     11.7    Platelets 220     211    Neutrophil Rel % 43.3     48.4    Immature Granulocyte Rel % 0.7     0.4    Lymphocyte Rel % 34.3     31.3    Monocyte Rel % 15.3     10.4    Eosinophil Rel % 5.7     8.5    Basophil Rel % 0.7     1.0       Details          This result is from an external source.              Lipid Panel          4/23/2024    08:41   Lipid Panel   Total Cholesterol 269    Triglycerides 210    HDL Cholesterol 63    VLDL Cholesterol 39    LDL Cholesterol  167    LDL/HDL Ratio 2.60       Lab Results   Component Value Date    TSH 3.320 04/23/2024      Lab Results   Component Value Date    FREET4 0.88 (L) 04/23/2024    FREET4 1.50 10/30/2020                          Visit Diagnoses:    ICD-10-CM ICD-9-CM   1. Welcome to Medicare preventive visit  Z00.00 V70.0   2. Discitis of lumbosacral region  M46.47 722.93   3. Vitamin D deficiency  E55.9 268.9   4. Mixed hyperlipidemia  E78.2 272.2   5. Heart murmur  R01.1 785.2   6. Recurrent urinary tract infection  N39.0 599.0   7. Elevated liver enzymes  R74.8 790.5   8. IFG (impaired fasting glucose)  R73.01 790.21   9. Encounter for screening for other viral diseases  Z11.59 V73.89       Assessment and Plan "   Diagnoses and all orders for this visit:    1. Welcome to Medicare preventive visit (Primary)    2. Discitis of lumbosacral region  -     Hemoglobin A1c; Future  -     Comprehensive Metabolic Panel; Future  -     CBC & Differential; Future  -     Lipid Panel; Future  -     Magnesium; Future  -     TSH+Free T4; Future  -     Anti-Smooth Muscle Antibody Titer; Future  -     Hepatitis C Antibody; Future  -     Iron Profile; Future  -     Hepatitis B Surface Antigen; Future  -     Ceruloplasmin; Future    3. Vitamin D deficiency  -     Hemoglobin A1c; Future  -     Comprehensive Metabolic Panel; Future  -     CBC & Differential; Future  -     Lipid Panel; Future  -     Magnesium; Future  -     TSH+Free T4; Future  -     Anti-Smooth Muscle Antibody Titer; Future  -     Hepatitis C Antibody; Future  -     Iron Profile; Future  -     Hepatitis B Surface Antigen; Future  -     Ceruloplasmin; Future    4. Mixed hyperlipidemia  -     Hemoglobin A1c; Future  -     Comprehensive Metabolic Panel; Future  -     CBC & Differential; Future  -     Lipid Panel; Future  -     Magnesium; Future  -     TSH+Free T4; Future  -     Anti-Smooth Muscle Antibody Titer; Future  -     Hepatitis C Antibody; Future  -     Iron Profile; Future  -     Hepatitis B Surface Antigen; Future  -     Ceruloplasmin; Future    5. Heart murmur  -     Hemoglobin A1c; Future  -     Comprehensive Metabolic Panel; Future  -     CBC & Differential; Future  -     Lipid Panel; Future  -     Magnesium; Future  -     TSH+Free T4; Future  -     Anti-Smooth Muscle Antibody Titer; Future  -     Hepatitis C Antibody; Future  -     Iron Profile; Future  -     Hepatitis B Surface Antigen; Future  -     Ceruloplasmin; Future    6. Recurrent urinary tract infection  -     Hemoglobin A1c; Future  -     Comprehensive Metabolic Panel; Future  -     CBC & Differential; Future  -     Lipid Panel; Future  -     Magnesium; Future  -     TSH+Free T4; Future  -     Anti-Smooth  Muscle Antibody Titer; Future  -     Hepatitis C Antibody; Future  -     Iron Profile; Future  -     Hepatitis B Surface Antigen; Future  -     Ceruloplasmin; Future    7. Elevated liver enzymes  -     Hemoglobin A1c; Future  -     Comprehensive Metabolic Panel; Future  -     CBC & Differential; Future  -     Lipid Panel; Future  -     Magnesium; Future  -     TSH+Free T4; Future  -     Anti-Smooth Muscle Antibody Titer; Future  -     Hepatitis C Antibody; Future  -     Iron Profile; Future  -     Hepatitis B Surface Antigen; Future  -     Ceruloplasmin; Future    8. IFG (impaired fasting glucose)  -     Hemoglobin A1c; Future  -     Comprehensive Metabolic Panel; Future  -     CBC & Differential; Future  -     Lipid Panel; Future  -     Magnesium; Future  -     TSH+Free T4; Future  -     Anti-Smooth Muscle Antibody Titer; Future  -     Hepatitis C Antibody; Future  -     Iron Profile; Future  -     Hepatitis B Surface Antigen; Future  -     Ceruloplasmin; Future    9. Encounter for screening for other viral diseases  -     Hepatitis B Surface Antigen; Future    Other orders  -     triamcinolone (KENALOG) 0.1 % cream; Apply 1 Application topically to the appropriate area as directed 2 (Two) Times a Day.  Dispense: 60 g; Refill: 5  -     miconazole (Desenex) 2 % powder; Apply 1 Application topically to the appropriate area as directed As Needed for Itching.  Dispense: 1 each; Refill: 5    Contact dermatitis, will use triamcinolone cream to the arms area from contact outside,    Tenia, intertrigo of the breast and breast fold areas, will use, will use Desenex powder    Dizziness, --? Tia, went to er and CTA, CT BRAIN --, December 2023, showed normal CT of the brain no stroke, CT angiogram showed a 80% stenosis of the external carotid artery otherwise minimal plaquing noted of the internal carotid arteries and common carotid arteries,    BMI is >= 25 and <30. (Overweight) The following options were offered after  discussion;: weight loss educational material (shared in after visit summary), exercise counseling/recommendations, and nutrition counseling/recommendations     Back surgery  2020 [facility] --for orif-    Hypomagnesemia, patient continues on magnesium supplements will try different route, April 2024,    Impaired fasting glucose ----will follow hemoglobin A1c    Gerd , cont protonix patient will decrease dose to every other day dosing to help with magnesium levels,    Mixed hyperlipidemia discussed treatment options, consider restarting atorvastatin 20 mg daily, patient has some leftover from previous treatment April 29, 2024, discussed use of this given numbers and aortic valve issues,    Heart murmur aortic valve, echo noted, February 21, 2024 showing normal ejection fraction, mild diastolic dysfunction mild aortic valve stenosis regurgitation, would follow every 1 to 2 years    Discitis--lumbar 2020-- sepsis--in hospital x 10 days with abx, subsequent transferred to [facility], ended up having back surgery, for discitis with rods, hardware,--- patient was placed on Keflex 500 mg daily for life per infectious disease Dr. Choudhury in [place], she had MSSA staph growing from the cultures,    Bilateral screening mammogram October 19, 2023 normal    Colonoscopy 2022--    Ct abd/ pelvis --sept 2023, normal     Hypomagnesemia, discussed treatment options April 29, 2024--- continues on over-the-counter Slow-Mag 3 tablets daily    FOOT SURGERY /right shoulder arthroplasty,    CAUTIONED ON ETOH USE ---                Follow Up   Return in about 6 months (around 10/29/2024).  Patient was given instructions and counseling regarding her condition or for health maintenance advice. Please see specific information pulled into the AVS if appropriate.

## 2024-04-29 NOTE — PROGRESS NOTES
The ABCs of the Annual Wellness Visit  [facility]    Subjective     Vaishali Castañeda is a 63 y.o. female who presents for a  Welcome to Medicare Visit.    The following portions of the patient's history were reviewed and   updated as appropriate: allergies, current medications, past family history, past medical history, past social history, past surgical history, and problem list.     Compared to one year ago, the patient feels her physical   health is better.    Compared to one year ago, the patient feels her mental   health is the same.    Recent Hospitalizations:  She was not admitted to the hospital during the last year.       Current Medical Providers:  Patient Care Team:  Festus Williamson MD as PCP - General (Internal Medicine)    Outpatient Medications Prior to Visit   Medication Sig Dispense Refill    cephalexin (KEFLEX) 500 MG capsule Take 1 capsule by mouth Daily. FOR STAFF INFECTION      Cholecalciferol (Vitamin D-3) 25 MCG (1000 UT) capsule Take 1,000 Units by mouth Daily. HOLD FOR SURGERY      lisinopril (PRINIVIL,ZESTRIL) 10 MG tablet TAKE 1 TABLET BY MOUTH DAILY 90 tablet 1    magnesium gluconate (MAGONATE) 500 MG tablet Take 1,500 mg by mouth 2 (Two) Times a Day.      metoprolol succinate XL (TOPROL-XL) 50 MG 24 hr tablet TAKE 1 TABLET BY MOUTH DAILY 90 tablet 1    montelukast (SINGULAIR) 10 MG tablet TAKE 1 TABLET BY MOUTH DAILY 90 tablet 1    Omega-3 Fatty Acids (fish oil) 1000 MG capsule capsule Take 1 capsule by mouth Daily With Breakfast.      pantoprazole (PROTONIX) 40 MG EC tablet Take 1 tablet by mouth Every Night.      sertraline (ZOLOFT) 25 MG tablet TAKE 2 TABLETS BY MOUTH DAILY 180 tablet 1    atorvastatin (LIPITOR) 20 MG tablet TAKE 1 TABLET BY MOUTH DAILY (Patient not taking: Reported on 4/29/2024) 90 tablet 1    hydrOXYzine (ATARAX) 10 MG tablet TAKE 1 TABLET BY MOUTH DAILY AS NEEDED (Patient not taking: Reported on 4/29/2024) 90 tablet 1    hydrOXYzine (ATARAX) 25  "MG tablet  (Patient not taking: Reported on 4/29/2024)       No facility-administered medications prior to visit.       No opioid medication identified on active medication list. I have reviewed chart for other potential  high risk medication/s and harmful drug interactions in the elderly.        Aspirin is not on active medication list.  Aspirin use is not indicated based on review of current medical condition/s. Risk of harm outweighs potential benefits.  .    Patient Active Problem List   Diagnosis    Recurrent urinary tract infection    Microhematuria    Annual physical exam    Discitis of lumbosacral region    Heart murmur    Mixed hyperlipidemia    Vitamin D deficiency    IFG (impaired fasting glucose)    Elevated liver enzymes    Welcome to Medicare preventive visit    Encounter for screening for other viral diseases     Advance Care Planning   Advance Care Planning     Advance Directive is not on file.  ACP discussion was held with the patient during this visit. Patient has an advance directive (not in EMR), copy requested.       Objective   Vitals:    04/29/24 1545   BP: 149/87   Pulse: 73   Temp: 97.6 °F (36.4 °C)   SpO2: 96%   Weight: 68 kg (150 lb)   Height: 160 cm (62.99\")     Estimated body mass index is 26.58 kg/m² as calculated from the following:    Height as of this encounter: 160 cm (62.99\").    Weight as of this encounter: 68 kg (150 lb).           Does the patient have evidence of cognitive impairment?   No    Lab Results   Component Value Date    TRIG 210 (H) 04/23/2024    HDL 63 (H) 04/23/2024     (H) 04/23/2024    VLDL 39 04/23/2024       Procedures       HEALTH RISK ASSESSMENT    Smoking Status:  Social History     Tobacco Use   Smoking Status Never   Smokeless Tobacco Never     Alcohol Consumption:  Social History     Substance and Sexual Activity   Alcohol Use Yes    Comment: 1 drink daily       Fall Risk Screen:    STEADI Fall Risk Assessment was completed, and patient is at LOW " risk for falls.Assessment completed on:2024    Depression Screen:       2024     2:57 PM   PHQ-2/PHQ-9 Depression Screening   Little Interest or Pleasure in Doing Things 0-->not at all   Feeling Down, Depressed or Hopeless 0-->not at all   PHQ-9: Brief Depression Severity Measure Score 0       Health Habits and Functional and Cognitive Screenin/29/2024     3:00 PM   Functional & Cognitive Status   Do you have difficulty preparing food and eating? No   Do you have difficulty bathing yourself, getting dressed or grooming yourself? No   Do you have difficulty using the toilet? No   Do you have difficulty moving around from place to place? No   Do you have trouble with steps or getting out of a bed or a chair? No   Current Diet Well Balanced Diet   Dental Exam Up to date   Eye Exam Up to date   Exercise (times per week) 3 times per week   Current Exercises Include Walking   Do you need help using the phone?  No   Are you deaf or do you have serious difficulty hearing?  No   Do you need help to go to places out of walking distance? No   Do you need help shopping? No   Do you need help preparing meals?  No   Do you need help with housework?  No   Do you need help with laundry? No   Do you need help taking your medications? No   Do you need help managing money? No   Do you ever drive or ride in a car without wearing a seat belt? No   Have you felt unusual stress, anger or loneliness in the last month? No   Who do you live with? Alone   If you need help, do you have trouble finding someone available to you? Yes   Have you been bothered in the last four weeks by sexual problems? No   Do you have difficulty concentrating, remembering or making decisions? No       Visual Acuity:    Vision Screening    Right eye Left eye Both eyes   Without correction 20/20 20/20 20/20   With correction          Age-appropriate Screening Schedule:  Refer to the list below for future screening recommendations based on  patient's age, sex and/or medical conditions. Orders for these recommended tests are listed in the plan section. The patient has been provided with a written plan.    Health Maintenance   Topic Date Due    COLORECTAL CANCER SCREENING  Never done    TDAP/TD VACCINES (1 - Tdap) Never done    RSV Vaccine - Adults (1 - 1-dose 60+ series) Never done    ZOSTER VACCINE (2 of 2) 01/25/2019    DXA SCAN  12/18/2019    Pneumococcal Vaccine 65+ (2 of 2 - PCV) 07/13/2023    COVID-19 Vaccine (5 - 2023-24 season) 09/01/2023    INFLUENZA VACCINE  08/01/2024    BMI FOLLOWUP  02/21/2025    LIPID PANEL  04/23/2025    ANNUAL WELLNESS VISIT  04/29/2025    MAMMOGRAM  10/18/2025    HEPATITIS C SCREENING  Completed        CMS Preventative Services Quick Reference  Risk Factors Identified During Encounter    None Identified  The above risks/problems have been discussed with the patient.  Pertinent information has been shared with the patient in the After Visit Summary.  Follow up plans and orders are seen below in the Assessment/Plan Section.    Diagnoses and all orders for this visit:    1. Welcome to Medicare preventive visit (Primary)    2. Discitis of lumbosacral region  -     Hemoglobin A1c; Future  -     Comprehensive Metabolic Panel; Future  -     CBC & Differential; Future  -     Lipid Panel; Future  -     Magnesium; Future  -     TSH+Free T4; Future  -     Anti-Smooth Muscle Antibody Titer; Future  -     Hepatitis C Antibody; Future  -     Iron Profile; Future  -     Hepatitis B Surface Antigen; Future  -     Ceruloplasmin; Future    3. Vitamin D deficiency  -     Hemoglobin A1c; Future  -     Comprehensive Metabolic Panel; Future  -     CBC & Differential; Future  -     Lipid Panel; Future  -     Magnesium; Future  -     TSH+Free T4; Future  -     Anti-Smooth Muscle Antibody Titer; Future  -     Hepatitis C Antibody; Future  -     Iron Profile; Future  -     Hepatitis B Surface Antigen; Future  -     Ceruloplasmin; Future    4.  Mixed hyperlipidemia  -     Hemoglobin A1c; Future  -     Comprehensive Metabolic Panel; Future  -     CBC & Differential; Future  -     Lipid Panel; Future  -     Magnesium; Future  -     TSH+Free T4; Future  -     Anti-Smooth Muscle Antibody Titer; Future  -     Hepatitis C Antibody; Future  -     Iron Profile; Future  -     Hepatitis B Surface Antigen; Future  -     Ceruloplasmin; Future    5. Heart murmur  -     Hemoglobin A1c; Future  -     Comprehensive Metabolic Panel; Future  -     CBC & Differential; Future  -     Lipid Panel; Future  -     Magnesium; Future  -     TSH+Free T4; Future  -     Anti-Smooth Muscle Antibody Titer; Future  -     Hepatitis C Antibody; Future  -     Iron Profile; Future  -     Hepatitis B Surface Antigen; Future  -     Ceruloplasmin; Future    6. Recurrent urinary tract infection  -     Hemoglobin A1c; Future  -     Comprehensive Metabolic Panel; Future  -     CBC & Differential; Future  -     Lipid Panel; Future  -     Magnesium; Future  -     TSH+Free T4; Future  -     Anti-Smooth Muscle Antibody Titer; Future  -     Hepatitis C Antibody; Future  -     Iron Profile; Future  -     Hepatitis B Surface Antigen; Future  -     Ceruloplasmin; Future    7. Elevated liver enzymes  -     Hemoglobin A1c; Future  -     Comprehensive Metabolic Panel; Future  -     CBC & Differential; Future  -     Lipid Panel; Future  -     Magnesium; Future  -     TSH+Free T4; Future  -     Anti-Smooth Muscle Antibody Titer; Future  -     Hepatitis C Antibody; Future  -     Iron Profile; Future  -     Hepatitis B Surface Antigen; Future  -     Ceruloplasmin; Future    8. IFG (impaired fasting glucose)  -     Hemoglobin A1c; Future  -     Comprehensive Metabolic Panel; Future  -     CBC & Differential; Future  -     Lipid Panel; Future  -     Magnesium; Future  -     TSH+Free T4; Future  -     Anti-Smooth Muscle Antibody Titer; Future  -     Hepatitis C Antibody; Future  -     Iron Profile; Future  -      Hepatitis B Surface Antigen; Future  -     Ceruloplasmin; Future    9. Encounter for screening for other viral diseases  -     Hepatitis B Surface Antigen; Future    Other orders  -     triamcinolone (KENALOG) 0.1 % cream; Apply 1 Application topically to the appropriate area as directed 2 (Two) Times a Day.  Dispense: 60 g; Refill: 5        Follow Up:   Initial Medicare Visit in one year    An After Visit Summary and PPPS were made available to the patient.

## 2024-08-19 RX ORDER — METOPROLOL SUCCINATE 50 MG/1
50 TABLET, EXTENDED RELEASE ORAL DAILY
Qty: 90 TABLET | Refills: 1 | Status: SHIPPED | OUTPATIENT
Start: 2024-08-19

## 2024-09-09 RX ORDER — SERTRALINE HYDROCHLORIDE 25 MG/1
50 TABLET, FILM COATED ORAL DAILY
Qty: 180 TABLET | Refills: 1 | Status: SHIPPED | OUTPATIENT
Start: 2024-09-09

## 2024-09-09 RX ORDER — MONTELUKAST SODIUM 10 MG/1
10 TABLET ORAL DAILY
Qty: 90 TABLET | Refills: 1 | Status: SHIPPED | OUTPATIENT
Start: 2024-09-09

## 2024-09-09 RX ORDER — LISINOPRIL 10 MG/1
10 TABLET ORAL DAILY
Qty: 90 TABLET | Refills: 1 | Status: SHIPPED | OUTPATIENT
Start: 2024-09-09

## 2024-10-25 ENCOUNTER — TELEPHONE (OUTPATIENT)
Dept: INTERNAL MEDICINE | Age: 64
End: 2024-10-25
Payer: MEDICARE

## 2024-10-25 NOTE — TELEPHONE ENCOUNTER
Caller: Vaishali Castañeda A    Relationship to patient: Self    Best call back number: [phone]    Patient is needing: PATIENT STATED MD GUEVARA WOULD LIKE HER ON A STATIN MEDICATION. SHE IS CURRENTLY ON ATORVASTIN 20 MG FROM A PREVIOUS PROVIDER. SHE WOULD LIKE MD GUEVARA TO BE THE PRESCRIBER AND SEND THE MEDICATION TO:       [id number] - [address] - [phone] [facility] [phone]  [phone]

## 2024-10-28 RX ORDER — ATORVASTATIN CALCIUM 20 MG/1
20 TABLET, FILM COATED ORAL DAILY
Qty: 90 TABLET | Refills: 3 | Status: SHIPPED | OUTPATIENT
Start: 2024-10-28

## 2024-11-08 ENCOUNTER — TELEPHONE (OUTPATIENT)
Dept: INTERNAL MEDICINE | Age: 64
End: 2024-11-08

## 2024-11-08 NOTE — TELEPHONE ENCOUNTER
Caller: Vaishali Castañeda    Relationship: Self    Best call back number: [phone]     What is the medical concern/diagnosis: DARK BROWN  SPOT ON BACK  SURROUNDED BY WHITE COLORATION THAT IS RAISED AND ITCHING . THIS SPOT HAS COME BACK AFTER HAVING BEEN REMOVED IN THE PAST    What specialty or service is being requested: DERMATOLOGY

## 2024-11-12 ENCOUNTER — OFFICE VISIT (OUTPATIENT)
Dept: INTERNAL MEDICINE | Age: 64
End: 2024-11-12
Payer: MEDICARE

## 2024-11-12 VITALS
DIASTOLIC BLOOD PRESSURE: 70 MMHG | OXYGEN SATURATION: 96 % | BODY MASS INDEX: 27.46 KG/M2 | SYSTOLIC BLOOD PRESSURE: 118 MMHG | TEMPERATURE: 97.1 F | HEIGHT: 63 IN | HEART RATE: 79 BPM | WEIGHT: 155 LBS

## 2024-11-12 DIAGNOSIS — L98.9 SKIN LESION OF BACK: ICD-10-CM

## 2024-11-12 DIAGNOSIS — Z23 NEED FOR VACCINATION: Primary | ICD-10-CM

## 2024-11-12 NOTE — PROGRESS NOTES
"Chief Complaint  Rash (Pt states she has a dark black colored spot that itches on her left upper back. Pt reports having this cut out before back in 2022 and it has come back. Pt wants to be referred to Derm)    Subjective      Vaishali Castañeda is a 64 y.o. female who presents to [facility]     History of Present Illness  The patient is a 64-year-old female who presents for evaluation of a recurring skin spot.  She has had a spot removed twice through dermatology but it comes back the last time it was removed was approximately 3 years ago she has noticed it now for the last couple months and it is getting larger.  She wants to have it removed again    She reports that the spot,  has been treated twice by a dermatologist but has since regrown. She describes the area as puffy and itchy. The last treatment was approximately 3 years ago. Additionally, she expresses a desire for a comprehensive skin examination due to her advancing age. Her last dermatology visit was about 2 or 3 years ago.  [facility]             Objective   Vital Signs:   Vitals:    11/12/24 1544   BP: 118/70   BP Location: Left arm   Patient Position: Sitting   Cuff Size: Adult   Pulse: 79   Temp: 97.1 °F (36.2 °C)   TempSrc: Skin   SpO2: 96%   Weight: 70.3 kg (155 lb)   Height: 160 cm (62.99\")     Body mass index is 27.46 kg/m².    Wt Readings from Last 3 Encounters:   11/12/24 70.3 kg (155 lb)   04/29/24 68 kg (150 lb)   02/21/24 66.7 kg (147 lb)     BP Readings from Last 3 Encounters:   11/12/24 118/70   04/29/24 149/87   02/21/24 162/92       Health Maintenance   Topic Date Due    COLORECTAL CANCER SCREENING  Never done    TDAP/TD VACCINES (1 - Tdap) Never done    ZOSTER VACCINE (2 of 2) 01/25/2019    DXA SCAN  12/18/2019    Pneumococcal Vaccine 65+ (2 of 2 - PCV) 07/13/2023    COVID-19 Vaccine (5 - 2024-25 season) 02/01/2025 (Originally 9/1/2024)    BMI FOLLOWUP  02/21/2025    LIPID PANEL  04/23/2025 "    ANNUAL WELLNESS VISIT  04/29/2025    MAMMOGRAM  10/18/2025    HEPATITIS C SCREENING  Completed    INFLUENZA VACCINE  Completed       Physical Exam  Constitutional:       Appearance: Normal appearance.   HENT:      Head: Normocephalic.      Nose: Nose normal.      Mouth/Throat:      Mouth: Mucous membranes are moist.   Eyes:      Conjunctiva/sclera: Conjunctivae normal.      Pupils: Pupils are equal, round, and reactive to light.   Cardiovascular:      Rate and Rhythm: Normal rate and regular rhythm.   Pulmonary:      Effort: Pulmonary effort is normal.      Breath sounds: Normal breath sounds.   Abdominal:      General: Bowel sounds are normal.      Palpations: Abdomen is soft.   Musculoskeletal:         General: Normal range of motion.      Cervical back: Normal range of motion.   Skin:     General: Skin is warm.      Comments: Small purple-black lesion noted left back under shoulder strap of her bra   Neurological:      General: No focal deficit present.      Mental Status: She is alert and oriented to person, place, and time.   Psychiatric:         Mood and Affect: Mood normal.         Behavior: Behavior normal.         Thought Content: Thought content normal.          Physical Exam         Result Review :  The following data was reviewed by: SHAMAR Casillas on 11/12/2024:    Labs  No visits with results within 2 Month(s) from this visit.   Latest known visit with results is:   Lab on 04/23/2024   Component Date Value Ref Range Status    Folate 04/23/2024 10.80  4.78 - 24.20 ng/mL Final    Sed Rate 04/23/2024 9  0 - 30 mm/hr Final    25 Hydroxy, Vitamin D 04/23/2024 65.0  30.0 - 100.0 ng/ml Final    Vitamin B-12 04/23/2024 644  211 - 946 pg/mL Final    TSH 04/23/2024 3.320  0.270 - 4.200 uIU/mL Final    Free T4 04/23/2024 0.88 (L)  0.93 - 1.70 ng/dL Final    T4 results may be falsely increased if patient taking Biotin.    Total Cholesterol 04/23/2024 269 (H)  0 - 200 mg/dL Final    Triglycerides  04/23/2024 210 (H)  0 - 150 mg/dL Final    HDL Cholesterol 04/23/2024 63 (H)  40 - 60 mg/dL Final    LDL Cholesterol  04/23/2024 167 (H)  0 - 100 mg/dL Final    VLDL Cholesterol 04/23/2024 39  5 - 40 mg/dL Final    LDL/HDL Ratio 04/23/2024 2.60   Final    Glucose 04/23/2024 116 (H)  65 - 99 mg/dL Final    BUN 04/23/2024 10  8 - 23 mg/dL Final    Creatinine 04/23/2024 0.59  0.57 - 1.00 mg/dL Final    Sodium 04/23/2024 136  136 - 145 mmol/L Final    Potassium 04/23/2024 4.8  3.5 - 5.2 mmol/L Final    Chloride 04/23/2024 100  98 - 107 mmol/L Final    CO2 04/23/2024 24.0  22.0 - 29.0 mmol/L Final    Calcium 04/23/2024 9.5  8.6 - 10.5 mg/dL Final    Total Protein 04/23/2024 7.1  6.0 - 8.5 g/dL Final    Albumin 04/23/2024 4.7  3.5 - 5.2 g/dL Final    ALT (SGPT) 04/23/2024 23  1 - 33 U/L Final    AST (SGOT) 04/23/2024 36 (H)  1 - 32 U/L Final    Alkaline Phosphatase 04/23/2024 56  39 - 117 U/L Final    Total Bilirubin 04/23/2024 0.5  0.0 - 1.2 mg/dL Final    Globulin 04/23/2024 2.4  gm/dL Final    A/G Ratio 04/23/2024 2.0  g/dL Final    BUN/Creatinine Ratio 04/23/2024 16.9  7.0 - 25.0 Final    Anion Gap 04/23/2024 12.0  5.0 - 15.0 mmol/L Final    eGFR 04/23/2024 100.2  >60.0 mL/min/1.73 Final    Magnesium 04/23/2024 1.4 (L)  1.6 - 2.4 mg/dL Final    WBC 04/23/2024 7.20  3.40 - 10.80 10*3/mm3 Final    RBC 04/23/2024 3.96  3.77 - 5.28 10*6/mm3 Final    Hemoglobin 04/23/2024 12.9  12.0 - 15.9 g/dL Final    Hematocrit 04/23/2024 38.3  34.0 - 46.6 % Final    MCV 04/23/2024 96.7  79.0 - 97.0 fL Final    MCH 04/23/2024 32.6  26.6 - 33.0 pg Final    MCHC 04/23/2024 33.7  31.5 - 35.7 g/dL Final    RDW 04/23/2024 11.7 (L)  12.3 - 15.4 % Final    RDW-SD 04/23/2024 42.1  37.0 - 54.0 fl Final    MPV 04/23/2024 11.9  6.0 - 12.0 fL Final    Platelets 04/23/2024 211  140 - 450 10*3/mm3 Final    Neutrophil % 04/23/2024 48.4  42.7 - 76.0 % Final    Lymphocyte % 04/23/2024 31.3  19.6 - 45.3 % Final    Monocyte % 04/23/2024 10.4  5.0 - 12.0 %  Final    Eosinophil % 04/23/2024 8.5 (H)  0.3 - 6.2 % Final    Basophil % 04/23/2024 1.0  0.0 - 1.5 % Final    Immature Grans % 04/23/2024 0.4  0.0 - 0.5 % Final    Neutrophils, Absolute 04/23/2024 3.49  1.70 - 7.00 10*3/mm3 Final    Lymphocytes, Absolute 04/23/2024 2.25  0.70 - 3.10 10*3/mm3 Final    Monocytes, Absolute 04/23/2024 0.75  0.10 - 0.90 10*3/mm3 Final    Eosinophils, Absolute 04/23/2024 0.61 (H)  0.00 - 0.40 10*3/mm3 Final    Basophils, Absolute 04/23/2024 0.07  0.00 - 0.20 10*3/mm3 Final    Immature Grans, Absolute 04/23/2024 0.03  0.00 - 0.05 10*3/mm3 Final    nRBC 04/23/2024 0.0  0.0 - 0.2 /100 WBC Final        Imaging  No Images in the past 120 days found..    Results         Procedures         ASSESSMENT & PLAN  Diagnoses and all orders for this visit:    1. Need for vaccination (Primary)  -     Fluzone High-Dose 65+yrs ([id number])    2. Skin lesion of back  -     Ambulatory Referral to Dermatology         Assessment & Plan  1. Skin lesion.  The lesion appears to be a scar, possibly an irritated hematoma under the scar tissue. It is growing and needs to be removed. A referral to a dermatologist will be made for further evaluation and removal of the lesion. A copy of the referral will be provided to her.  As she is a current patient of dermatology she may be able to schedule herself but we will work on the referral                       FOLLOW UP  No follow-ups on file.  Patient was given instructions and counseling regarding her condition or for health maintenance advice. Please see specific information pulled into the AVS if appropriate.     Patient or patient representative verbalized consent for the use of Ambient Listening during the visit with  SHAMAR Casillas for chart documentation. 11/12/2024  16:24 EST    SHAMAR Casillas  11/12/24  16:25 EST

## 2024-12-06 ENCOUNTER — TELEPHONE (OUTPATIENT)
Dept: INTERNAL MEDICINE | Age: 64
End: 2024-12-06
Payer: MEDICARE

## 2024-12-06 NOTE — TELEPHONE ENCOUNTER
"Caller: Vaishali Castañeda    Relationship: Self    Best call back number:     [phone]        What medication are you requesting: PATIENT UNSURE, SHE STATES THAT IT STARTS WITH A \"V\" AND IS A BLUE-OTTO PURPLE PILL USUALLY FOR FEVER BLISTERS     What are your current symptoms: SWELLING AROUND LIPS, (PATIENT DENIES ANY ISSUES BREATHING, SWALLOWING OR SPEAKING)    How long have you been experiencing symptoms: THREE DAYS    Have you had these symptoms before:    [x] Yes  [] No    Have you been treated for these symptoms before:   [x] Yes  [] No    If a prescription is needed, what is your preferred pharmacy and phone number: [facility] #[id number] - [address] - [phone] [facility] [phone] FX     Additional notes: PATIENT STATES THAT THIS HAS HAPPENED A FEW TIMES OVER THE LAST FOUR YEARS.     "

## 2024-12-10 ENCOUNTER — TELEPHONE (OUTPATIENT)
Dept: INTERNAL MEDICINE | Age: 64
End: 2024-12-10

## 2024-12-10 RX ORDER — VALACYCLOVIR HYDROCHLORIDE 500 MG/1
500 TABLET, FILM COATED ORAL 2 TIMES DAILY
Qty: 20 TABLET | Refills: 0 | Status: SHIPPED | OUTPATIENT
Start: 2024-12-10

## 2024-12-10 NOTE — TELEPHONE ENCOUNTER
Caller: Vaishali Castañeda    Relationship: Self    Best call back number: [phone]    What are your concerns: PATIENT WENT TO URGENT CARE TODAY, 12/10/2024, AND WAS DIAGNOSED WITH COVID. SHE WAS PRESCRIBED 10 DAY PACK OF PAXLOVID AND PREDNISONE, BUT SHE WAS INFORMED BY THE PHARMACY THAT SHE SHOULD NOT TAKE THE PAXLOVID WITH THE STATIN MEDICATION THAT SHE TAKES REGULARLY. PATIENT IS NEEDING CONFIRMATION OF THAT.

## 2024-12-30 ENCOUNTER — TELEPHONE (OUTPATIENT)
Dept: INTERNAL MEDICINE | Age: 64
End: 2024-12-30
Payer: MEDICARE

## 2024-12-30 DIAGNOSIS — N39.0 RECURRENT URINARY TRACT INFECTION: Primary | ICD-10-CM

## 2024-12-30 RX ORDER — CEPHALEXIN 500 MG/1
500 CAPSULE ORAL 4 TIMES DAILY
Qty: 90 CAPSULE | Refills: 3 | Status: SHIPPED | OUTPATIENT
Start: 2024-12-30

## 2024-12-30 NOTE — TELEPHONE ENCOUNTER
Caller: Vaishali Castañeda    Relationship: Self    Best call back number: [phone]     Requested Prescriptions:   CEPHALEXIN       Pharmacy where request should be sent: [id number] [place] [address] [address] [facility] AT [address] - [phone] [facility] [phone] FX     Last office visit with prescribing clinician: 4/29/2024   Last telemedicine visit with prescribing clinician: Visit date not found   Next office visit with prescribing clinician: 1/21/2025    Additional details provided by patient: CALLER STATED THAT SHE IS OUT OF MEDICATION AND THAT AT A PAST APPOINTMENT DR GILL AGREED TO TAKE THIS MEDICATION OVER FROM DR ANGELIQUE WILLIAMSON    Does the patient have less than a 3 day supply:  [x] Yes  [] No    Nicol Novoa Rep   12/30/24 14:28 EST

## 2025-03-03 RX ORDER — MONTELUKAST SODIUM 10 MG/1
10 TABLET ORAL DAILY
Qty: 90 TABLET | Refills: 1 | Status: SHIPPED | OUTPATIENT
Start: 2025-03-03

## 2025-03-10 RX ORDER — SERTRALINE HYDROCHLORIDE 25 MG/1
50 TABLET, FILM COATED ORAL DAILY
Qty: 180 TABLET | Refills: 1 | Status: SHIPPED | OUTPATIENT
Start: 2025-03-10

## 2025-03-12 RX ORDER — LISINOPRIL 10 MG/1
10 TABLET ORAL DAILY
Qty: 90 TABLET | Refills: 1 | Status: SHIPPED | OUTPATIENT
Start: 2025-03-12

## 2025-04-29 ENCOUNTER — TELEPHONE (OUTPATIENT)
Dept: INTERNAL MEDICINE | Age: 65
End: 2025-04-29
Payer: MEDICARE

## 2025-04-29 RX ORDER — METOPROLOL SUCCINATE 50 MG/1
50 TABLET, EXTENDED RELEASE ORAL DAILY
Qty: 90 TABLET | Refills: 3 | Status: SHIPPED | OUTPATIENT
Start: 2025-04-29

## 2025-05-06 ENCOUNTER — TELEPHONE (OUTPATIENT)
Dept: INTERNAL MEDICINE | Age: 65
End: 2025-05-06

## 2025-05-06 DIAGNOSIS — R74.8 ELEVATED LIVER ENZYMES: ICD-10-CM

## 2025-05-06 DIAGNOSIS — Z00.00 ANNUAL PHYSICAL EXAM: Primary | ICD-10-CM

## 2025-05-06 DIAGNOSIS — E78.2 MIXED HYPERLIPIDEMIA: ICD-10-CM

## 2025-05-06 DIAGNOSIS — E55.9 VITAMIN D DEFICIENCY: ICD-10-CM

## 2025-05-06 NOTE — TELEPHONE ENCOUNTER
Caller: Vaishali Castañeda    Relationship: Self    Best call back number: [phone]    What orders are you requesting (i.e. lab or imaging): BLOOD WORK ORDERS     In what timeframe would the patient need to come in: TOMORROW MORNING IF POSSIBLE     Where will you receive your lab/imaging services: IN OFFICE     Additional notes: PATIENT STATES SHE DID HAVE A FULL PANEL OF BLOOD WORK BUT DID NOT GET IT DRAWN IN TIME AND IT . WANTING TO KNOW IF PROVIDER CAN REORDER EVERYTHING TO HAVE DRAWN TOMORROW MORNING IF POSSIBLE.

## 2025-05-07 ENCOUNTER — CLINICAL SUPPORT (OUTPATIENT)
Dept: INTERNAL MEDICINE | Age: 65
End: 2025-05-07
Payer: MEDICARE

## 2025-05-07 DIAGNOSIS — Z00.00 ANNUAL PHYSICAL EXAM: ICD-10-CM

## 2025-05-07 DIAGNOSIS — E78.2 MIXED HYPERLIPIDEMIA: ICD-10-CM

## 2025-05-07 DIAGNOSIS — E55.9 VITAMIN D DEFICIENCY: ICD-10-CM

## 2025-05-07 LAB
25(OH)D3 SERPL-MCNC: 58.4 NG/ML (ref 30–100)
ALBUMIN SERPL-MCNC: 4.9 G/DL (ref 3.5–5.2)
ALBUMIN/GLOB SERPL: 1.6 G/DL
ALP SERPL-CCNC: 98 U/L (ref 39–117)
ALT SERPL W P-5'-P-CCNC: 38 U/L (ref 1–33)
ANION GAP SERPL CALCULATED.3IONS-SCNC: 17.3 MMOL/L (ref 5–15)
AST SERPL-CCNC: 81 U/L (ref 1–32)
BASOPHILS # BLD AUTO: 0.06 10*3/MM3 (ref 0–0.2)
BASOPHILS NFR BLD AUTO: 0.9 % (ref 0–1.5)
BILIRUB SERPL-MCNC: 0.5 MG/DL (ref 0–1.2)
BUN SERPL-MCNC: 7 MG/DL (ref 8–23)
BUN/CREAT SERPL: 10.6 (ref 7–25)
CALCIUM SPEC-SCNC: 10.4 MG/DL (ref 8.6–10.5)
CHLORIDE SERPL-SCNC: 92 MMOL/L (ref 98–107)
CHOLEST SERPL-MCNC: 185 MG/DL (ref 0–200)
CO2 SERPL-SCNC: 24.7 MMOL/L (ref 22–29)
CREAT SERPL-MCNC: 0.66 MG/DL (ref 0.57–1)
DEPRECATED RDW RBC AUTO: 38.4 FL (ref 37–54)
EGFRCR SERPLBLD CKD-EPI 2021: 96.9 ML/MIN/1.73
EOSINOPHIL # BLD AUTO: 0.54 10*3/MM3 (ref 0–0.4)
EOSINOPHIL NFR BLD AUTO: 8.2 % (ref 0.3–6.2)
ERYTHROCYTE [DISTWIDTH] IN BLOOD BY AUTOMATED COUNT: 11.4 % (ref 12.3–15.4)
ERYTHROCYTE [SEDIMENTATION RATE] IN BLOOD: 13 MM/HR (ref 0–30)
GLOBULIN UR ELPH-MCNC: 3 GM/DL
GLUCOSE SERPL-MCNC: 129 MG/DL (ref 65–99)
HCT VFR BLD AUTO: 37.4 % (ref 34–46.6)
HDLC SERPL-MCNC: 74 MG/DL (ref 40–60)
HGB BLD-MCNC: 12.8 G/DL (ref 12–15.9)
IMM GRANULOCYTES # BLD AUTO: 0.02 10*3/MM3 (ref 0–0.05)
IMM GRANULOCYTES NFR BLD AUTO: 0.3 % (ref 0–0.5)
LDLC SERPL CALC-MCNC: 94 MG/DL (ref 0–100)
LDLC/HDLC SERPL: 1.25 {RATIO}
LYMPHOCYTES # BLD AUTO: 1.66 10*3/MM3 (ref 0.7–3.1)
LYMPHOCYTES NFR BLD AUTO: 25.1 % (ref 19.6–45.3)
MAGNESIUM SERPL-MCNC: 1.1 MG/DL (ref 1.6–2.4)
MCH RBC QN AUTO: 31.8 PG (ref 26.6–33)
MCHC RBC AUTO-ENTMCNC: 34.2 G/DL (ref 31.5–35.7)
MCV RBC AUTO: 92.8 FL (ref 79–97)
MONOCYTES # BLD AUTO: 0.78 10*3/MM3 (ref 0.1–0.9)
MONOCYTES NFR BLD AUTO: 11.8 % (ref 5–12)
NEUTROPHILS NFR BLD AUTO: 3.56 10*3/MM3 (ref 1.7–7)
NEUTROPHILS NFR BLD AUTO: 53.7 % (ref 42.7–76)
NRBC BLD AUTO-RTO: 0 /100 WBC (ref 0–0.2)
PLATELET # BLD AUTO: 222 10*3/MM3 (ref 140–450)
PMV BLD AUTO: 11.5 FL (ref 6–12)
POTASSIUM SERPL-SCNC: 4 MMOL/L (ref 3.5–5.2)
PROT SERPL-MCNC: 7.9 G/DL (ref 6–8.5)
RBC # BLD AUTO: 4.03 10*6/MM3 (ref 3.77–5.28)
SODIUM SERPL-SCNC: 134 MMOL/L (ref 136–145)
T4 FREE SERPL-MCNC: 1.14 NG/DL (ref 0.92–1.68)
TRIGL SERPL-MCNC: 94 MG/DL (ref 0–150)
TSH SERPL DL<=0.05 MIU/L-ACNC: 4.66 UIU/ML (ref 0.27–4.2)
VIT B12 BLD-MCNC: 775 PG/ML (ref 211–946)
VLDLC SERPL-MCNC: 17 MG/DL (ref 5–40)
WBC NRBC COR # BLD AUTO: 6.62 10*3/MM3 (ref 3.4–10.8)

## 2025-05-07 PROCEDURE — 84443 ASSAY THYROID STIM HORMONE: CPT | Performed by: INTERNAL MEDICINE

## 2025-05-07 PROCEDURE — 80061 LIPID PANEL: CPT | Performed by: INTERNAL MEDICINE

## 2025-05-07 PROCEDURE — 80053 COMPREHEN METABOLIC PANEL: CPT | Performed by: INTERNAL MEDICINE

## 2025-05-07 PROCEDURE — 36415 COLL VENOUS BLD VENIPUNCTURE: CPT | Performed by: INTERNAL MEDICINE

## 2025-05-07 PROCEDURE — 85025 COMPLETE CBC W/AUTO DIFF WBC: CPT | Performed by: INTERNAL MEDICINE

## 2025-05-07 PROCEDURE — 82607 VITAMIN B-12: CPT | Performed by: INTERNAL MEDICINE

## 2025-05-07 PROCEDURE — 85652 RBC SED RATE AUTOMATED: CPT | Performed by: INTERNAL MEDICINE

## 2025-05-07 PROCEDURE — 83735 ASSAY OF MAGNESIUM: CPT | Performed by: INTERNAL MEDICINE

## 2025-05-07 PROCEDURE — 82306 VITAMIN D 25 HYDROXY: CPT | Performed by: INTERNAL MEDICINE

## 2025-05-07 PROCEDURE — 84439 ASSAY OF FREE THYROXINE: CPT | Performed by: INTERNAL MEDICINE

## 2025-05-12 ENCOUNTER — OFFICE VISIT (OUTPATIENT)
Dept: INTERNAL MEDICINE | Age: 65
End: 2025-05-12
Payer: MEDICARE

## 2025-05-12 VITALS
SYSTOLIC BLOOD PRESSURE: 151 MMHG | HEIGHT: 63 IN | DIASTOLIC BLOOD PRESSURE: 80 MMHG | HEART RATE: 79 BPM | WEIGHT: 152.8 LBS | TEMPERATURE: 96.9 F | BODY MASS INDEX: 27.07 KG/M2 | OXYGEN SATURATION: 96 %

## 2025-05-12 DIAGNOSIS — R01.1 HEART MURMUR: ICD-10-CM

## 2025-05-12 DIAGNOSIS — Z00.00 MEDICARE ANNUAL WELLNESS VISIT, SUBSEQUENT: Primary | ICD-10-CM

## 2025-05-12 DIAGNOSIS — E55.9 VITAMIN D DEFICIENCY: ICD-10-CM

## 2025-05-12 DIAGNOSIS — R74.8 ELEVATED LIVER ENZYMES: ICD-10-CM

## 2025-05-12 DIAGNOSIS — E78.2 MIXED HYPERLIPIDEMIA: ICD-10-CM

## 2025-05-12 DIAGNOSIS — R73.01 IFG (IMPAIRED FASTING GLUCOSE): ICD-10-CM

## 2025-05-12 PROBLEM — Z12.11 ENCOUNTER FOR SCREENING FOR MALIGNANT NEOPLASM OF COLON: Status: ACTIVE | Noted: 2025-05-12

## 2025-05-12 RX ORDER — ALBUTEROL SULFATE 90 UG/1
2 INHALANT RESPIRATORY (INHALATION) EVERY 6 HOURS PRN
COMMUNITY

## 2025-05-12 NOTE — ASSESSMENT & PLAN NOTE
Lipid abnormalities are improving with treatment    Plan:  Continue same medication/s without change.      Discussed medication dosage, use, side effects, and goals of treatment in detail.    Counseled patient on lifestyle modifications to help control hyperlipidemia.     Patient Treatment Goals:   LDL goal is less than 70  LDL goal is under 100    Followup in 6 months.    Orders:    Comprehensive Metabolic Panel; Future    TSH+Free T4; Future

## 2025-05-12 NOTE — PROGRESS NOTES
Subjective   The ABCs of the Annual Wellness Visit  Medicare Wellness Visit      Vaishali Castañeda is a 64 y.o. patient who presents for a Medicare Wellness Visit.    The following portions of the patient's history were reviewed and   updated as appropriate: allergies, current medications, past family history, past medical history, past social history, past surgical history, and problem list.    Compared to one year ago, the patient's physical   health is the same.  Compared to one year ago, the patient's mental   health is the same.    Recent Hospitalizations:  She was not admitted to the hospital during the last year.     Current Medical Providers:  Patient Care Team:  Festus Williamson MD as PCP - General (Internal Medicine)    Outpatient Medications Prior to Visit   Medication Sig Dispense Refill    albuterol sulfate  (90 Base) MCG/ACT inhaler Inhale 2 puffs Every 6 (Six) Hours As Needed for Wheezing or Shortness of Air.      atorvastatin (LIPITOR) 20 MG tablet Take 1 tablet by mouth Daily. 90 tablet 3    cephalexin (Keflex) 500 MG capsule Take 1 capsule by mouth 4 (Four) Times a Day. (Patient taking differently: Take 1 capsule by mouth Daily.) 90 capsule 3    Cholecalciferol (Vitamin D-3) 25 MCG (1000 UT) capsule Take 1,000 Units by mouth Daily. HOLD FOR SURGERY      lisinopril (PRINIVIL,ZESTRIL) 10 MG tablet TAKE 1 TABLET BY MOUTH DAILY 90 tablet 1    magnesium gluconate (MAGONATE) 500 MG tablet Take 2 tablets by mouth 2 (Two) Times a Day.      metoprolol succinate XL (TOPROL-XL) 50 MG 24 hr tablet Take 1 tablet by mouth Daily. 90 tablet 3    montelukast (SINGULAIR) 10 MG tablet TAKE 1 TABLET BY MOUTH DAILY 90 tablet 1    Omega-3 Fatty Acids (fish oil) 1000 MG capsule capsule Take 1 capsule by mouth Daily With Breakfast.      pantoprazole (PROTONIX) 40 MG EC tablet Take 1 tablet by mouth Every Night. 90 tablet 3    sertraline (ZOLOFT) 25 MG tablet TAKE 2 TABLETS BY MOUTH DAILY 180 tablet 1     "triamcinolone (KENALOG) 0.1 % cream Apply 1 Application topically to the appropriate area as directed 2 (Two) Times a Day. 60 g 5    valACYclovir (Valtrex) 500 MG tablet Take 1 tablet by mouth 2 (Two) Times a Day. (Patient taking differently: Take 1 tablet by mouth 2 (Two) Times a Day As Needed (fever blister).) 20 tablet 0    cephalexin (KEFLEX) 500 MG capsule Take 1 capsule by mouth Daily. FOR STAFF INFECTION      miconazole (Desenex) 2 % powder Apply 1 Application topically to the appropriate area as directed As Needed for Itching. (Patient not taking: Reported on 11/12/2024) 1 each 5     No facility-administered medications prior to visit.     No opioid medication identified on active medication list. I have reviewed chart for other potential  high risk medication/s and harmful drug interactions in the elderly.      Aspirin is not on active medication list.  Aspirin use is not indicated based on review of current medical condition/s. Risk of harm outweighs potential benefits.  .    Patient Active Problem List   Diagnosis    Recurrent urinary tract infection    Microhematuria    Annual physical exam    Discitis of lumbosacral region    Heart murmur    Mixed hyperlipidemia    Vitamin D deficiency    IFG (impaired fasting glucose)    Elevated liver enzymes    Welcome to Medicare preventive visit    Encounter for screening for other viral diseases    Encounter for screening for malignant neoplasm of colon    Medicare annual wellness visit, subsequent     Advance Care Planning Advance Directive is not on file.  ACP discussion was held with the patient during this visit. Patient does not have an advance directive, information provided.            Objective   Vitals:    05/12/25 1531   BP: 151/80   BP Location: Right arm   Patient Position: Sitting   Cuff Size: Adult   Pulse: 79   Temp: 96.9 °F (36.1 °C)   TempSrc: Skin   SpO2: 96%   Weight: 69.3 kg (152 lb 12.8 oz)   Height: 160 cm (62.99\")   PainSc: 0-No pain " "      Estimated body mass index is 27.08 kg/m² as calculated from the following:    Height as of this encounter: 160 cm (62.99\").    Weight as of this encounter: 69.3 kg (152 lb 12.8 oz).    BMI is >= 25 and <30. (Overweight) The following options were offered after discussion;: weight loss educational material (shared in after visit summary) and exercise counseling/recommendations           Does the patient have evidence of cognitive impairment? No  Lab Results   Component Value Date    TRIG 94 2025    HDL 74 (H) 2025    LDL 94 2025    VLDL 17 2025                                                                                               Health  Risk Assessment    Smoking Status:  Social History     Tobacco Use   Smoking Status Never   Smokeless Tobacco Never     Alcohol Consumption:  Social History     Substance and Sexual Activity   Alcohol Use Yes    Comment: 1 drink daily       Fall Risk Screen  STEADI Fall Risk Assessment was completed, and patient is at LOW risk for falls.Assessment completed on:2025    Depression Screening   Little interest or pleasure in doing things? Not at all   Feeling down, depressed, or hopeless? Several days   PHQ-2 Total Score 1      Health Habits and Functional and Cognitive Screenin/12/2025     3:29 PM   Functional & Cognitive Status   Do you have difficulty preparing food and eating? No   Do you have difficulty bathing yourself, getting dressed or grooming yourself? No   Do you have difficulty using the toilet? No   Do you have difficulty moving around from place to place? No   Do you have trouble with steps or getting out of a bed or a chair? No   Current Diet Unhealthy Diet   Dental Exam Up to date   Eye Exam Up to date   Exercise (times per week) 7 times per week   Current Exercises Include Walking;Other   Do you need help using the phone?  No   Are you deaf or do you have serious difficulty hearing?  No   Do you need help to go to " places out of walking distance? No   Do you need help shopping? No   Do you need help preparing meals?  No   Do you need help with housework?  No   Do you need help with laundry? No   Do you need help taking your medications? No   Do you need help managing money? No   Do you ever drive or ride in a car without wearing a seat belt? No   Have you felt unusual stress, anger or loneliness in the last month? Yes   Who do you live with? Alone   If you need help, do you have trouble finding someone available to you? No   Have you been bothered in the last four weeks by sexual problems? No   Do you have difficulty concentrating, remembering or making decisions? No           Age-appropriate Screening Schedule:  Refer to the list below for future screening recommendations based on patient's age, sex and/or medical conditions. Orders for these recommended tests are listed in the plan section. The patient has been provided with a written plan.    Health Maintenance List  Health Maintenance   Topic Date Due    TDAP/TD VACCINES (1 - Tdap) Never done    COLORECTAL CANCER SCREENING  Never done    ZOSTER VACCINE (2 of 2) 01/25/2019    Pneumococcal Vaccine 50+ (2 of 2 - PCV) 09/12/2021    COVID-19 Vaccine (6 - 2024-25 season) 12/31/2025 (Originally 9/1/2024)    INFLUENZA VACCINE  07/01/2025    LIPID PANEL  05/07/2026    ANNUAL WELLNESS VISIT  05/12/2026    MAMMOGRAM  03/04/2027    DXA SCAN  03/04/2027    HEPATITIS C SCREENING  Completed                                                                                                                                                CMS Preventative Services Quick Reference  Risk Factors Identified During Encounter  Alcohol Misuse: Patient encouraged to limit alcohol use to no more than 1 standard alcoholic beverage per day. (12 ounce beer, 6 ounce wine, one shot liquor)    The above risks/problems have been discussed with the patient.  Pertinent information has been shared with the  patient in the After Visit Summary.  An After Visit Summary and PPPS were made available to the patient.    Follow Up:   Next Medicare Wellness visit to be scheduled in 1 year.     Assessment & Plan  IFG (impaired fasting glucose)    Orders:    Comprehensive Metabolic Panel; Future    TSH+Free T4; Future    Heart murmur    Orders:    Comprehensive Metabolic Panel; Future    TSH+Free T4; Future    Mixed hyperlipidemia   Lipid abnormalities are improving with treatment    Plan:  Continue same medication/s without change.      Discussed medication dosage, use, side effects, and goals of treatment in detail.    Counseled patient on lifestyle modifications to help control hyperlipidemia.     Patient Treatment Goals:   LDL goal is less than 70  LDL goal is under 100    Followup in 6 months.    Orders:    Comprehensive Metabolic Panel; Future    TSH+Free T4; Future    Vitamin D deficiency    Orders:    Comprehensive Metabolic Panel; Future    TSH+Free T4; Future    Elevated liver enzymes    Orders:    Comprehensive Metabolic Panel; Future    TSH+Free T4; Future    Medicare annual wellness visit, subsequent    Orders:    Comprehensive Metabolic Panel; Future    TSH+Free T4; Future         Follow Up:   Return in about 6 months (around 11/12/2025).

## 2025-05-12 NOTE — PROGRESS NOTES
"Chief Complaint   Patient presents with    Medicare Wellness-subsequent     63 yo female presents for subsequent wellness visit and lab review. Pt has had labs. No new issues.    Hyperlipidemia    Heart Murmur        Objective   Vital Signs  Vitals:    05/12/25 1531   BP: 151/80   BP Location: Right arm   Patient Position: Sitting   Cuff Size: Adult   Pulse: 79   Temp: 96.9 °F (36.1 °C)   TempSrc: Skin   SpO2: 96%   Weight: 69.3 kg (152 lb 12.8 oz)   Height: 160 cm (62.99\")      Body mass index is 27.08 kg/m².  Review of Systems   Constitutional: Negative.    HENT: Negative.     Eyes: Negative.    Respiratory: Negative.     Cardiovascular: Negative.    Gastrointestinal: Negative.    Endocrine: Negative.    Genitourinary: Negative.    Musculoskeletal: Negative.    Allergic/Immunologic: Negative.    Neurological: Negative.    Hematological: Negative.    Psychiatric/Behavioral: Negative.        Physical Exam  Constitutional:       General: She is not in acute distress.     Appearance: Normal appearance.   HENT:      Head: Normocephalic.      Mouth/Throat:      Mouth: Mucous membranes are moist.   Eyes:      Conjunctiva/sclera: Conjunctivae normal.      Pupils: Pupils are equal, round, and reactive to light.   Cardiovascular:      Rate and Rhythm: Normal rate and regular rhythm.      Pulses: Normal pulses.      Heart sounds: Normal heart sounds.   Pulmonary:      Effort: Pulmonary effort is normal.      Breath sounds: Normal breath sounds.   Abdominal:      General: Bowel sounds are normal.      Palpations: Abdomen is soft.   Musculoskeletal:         General: No swelling. Normal range of motion.      Cervical back: Neck supple.   Skin:     General: Skin is warm and dry.      Coloration: Skin is not jaundiced.   Neurological:      General: No focal deficit present.      Mental Status: She is alert and oriented to person, place, and time. Mental status is at baseline.   Psychiatric:         Mood and Affect: Mood normal.   " "      Behavior: Behavior normal.         Thought Content: Thought content normal.         Judgment: Judgment normal.        Result Review :   No results found for: \"PROBNP\", \"BNP\"  CMP          5/7/2025    08:19   CMP   Glucose 129    BUN 7    Creatinine 0.66    EGFR 96.9    Sodium 134    Potassium 4.0    Chloride 92    Calcium 10.4    Total Protein 7.9    Albumin 4.9    Globulin 3.0    Total Bilirubin 0.5    Alkaline Phosphatase 98    AST (SGOT) 81    ALT (SGPT) 38    Albumin/Globulin Ratio 1.6    BUN/Creatinine Ratio 10.6    Anion Gap 17.3      CBC w/diff          5/7/2025    08:19   CBC w/Diff   WBC 6.62    RBC 4.03    Hemoglobin 12.8    Hematocrit 37.4    MCV 92.8    MCH 31.8    MCHC 34.2    RDW 11.4    Platelets 222    Neutrophil Rel % 53.7    Immature Granulocyte Rel % 0.3    Lymphocyte Rel % 25.1    Monocyte Rel % 11.8    Eosinophil Rel % 8.2    Basophil Rel % 0.9       Lipid Panel          5/7/2025    08:19   Lipid Panel   Total Cholesterol 185    Triglycerides 94    HDL Cholesterol 74    VLDL Cholesterol 17    LDL Cholesterol  94    LDL/HDL Ratio 1.25       Lab Results   Component Value Date    TSH 4.660 (H) 05/07/2025    TSH 3.320 04/23/2024      Lab Results   Component Value Date    FREET4 1.14 05/07/2025    FREET4 0.88 (L) 04/23/2024    FREET4 1.50 10/30/2020                          Visit Diagnoses:    ICD-10-CM ICD-9-CM   1. Medicare annual wellness visit, subsequent  Z00.00 V70.0   2. IFG (impaired fasting glucose)  R73.01 790.21   3. Heart murmur  R01.1 785.2   4. Mixed hyperlipidemia  E78.2 272.2   5. Vitamin D deficiency  E55.9 268.9   6. Elevated liver enzymes  R74.8 790.5       Assessment and Plan   Diagnoses and all orders for this visit:    1. Medicare annual wellness visit, subsequent (Primary)  Assessment & Plan:    Orders:    Comprehensive Metabolic Panel; Future    TSH+Free T4; Future      Orders:  -     Comprehensive Metabolic Panel; Future  -     TSH+Free T4; Future    2. IFG (impaired " fasting glucose)  Assessment & Plan:    Orders:    Comprehensive Metabolic Panel; Future    TSH+Free T4; Future      Orders:  -     Comprehensive Metabolic Panel; Future  -     TSH+Free T4; Future    3. Heart murmur  Assessment & Plan:    Orders:    Comprehensive Metabolic Panel; Future    TSH+Free T4; Future      Orders:  -     Comprehensive Metabolic Panel; Future  -     TSH+Free T4; Future    4. Mixed hyperlipidemia  Assessment & Plan:   Lipid abnormalities are improving with treatment    Plan:  Continue same medication/s without change.      Discussed medication dosage, use, side effects, and goals of treatment in detail.    Counseled patient on lifestyle modifications to help control hyperlipidemia.     Patient Treatment Goals:   LDL goal is less than 70  LDL goal is under 100    Followup in 6 months.    Orders:    Comprehensive Metabolic Panel; Future    TSH+Free T4; Future      Orders:  -     Comprehensive Metabolic Panel; Future  -     TSH+Free T4; Future    5. Vitamin D deficiency  Assessment & Plan:    Orders:    Comprehensive Metabolic Panel; Future    TSH+Free T4; Future      Orders:  -     Comprehensive Metabolic Panel; Future  -     TSH+Free T4; Future    6. Elevated liver enzymes  Assessment & Plan:    Orders:    Comprehensive Metabolic Panel; Future    TSH+Free T4; Future      Orders:  -     Comprehensive Metabolic Panel; Future  -     TSH+Free T4; Future    [facility] wellness questionnaire completed May 12, 2025    dizziness, --? Tia, went to er and CTA, CT BRAIN --, December 2023, showed normal CT of the brain no stroke, CT angiogram showed a 80% stenosis of the external carotid artery otherwise minimal plaquing noted of the internal carotid arteries and common carotid arteries,    Elevated lfts, ? Etoh use, ---cautioned on this and will follow     Anxiety, depression, cont zoloft 25 mg 2 qd     Back surgery  2020 garett --for orif-    Allergies, wheezing, possible asthma started on Singulair  many years ago continues 10 mg daily    Hypomagnesemia, patient continues on magnesium supplements    Impaired fasting glucose ----will follow hemoglobin A1c    Gerd , cont protonix  every day dosing to help with magnesium levels,    Mixed hyperlipidemia continues atorvastatin 20 mg daily    Heart murmur aortic valve, echo noted, February 21, 2024 showing normal ejection fraction, mild diastolic dysfunction mild aortic valve stenosis regurgitation, would follow every 1 to 2 years    Discitis--lumbar 2020-- sepsis--in hospital x 10 days with abx, subsequent transferred to [facility] [facility], ended up having back surgery, for discitis with rods, hardware,--- patient was placed on Keflex 500 mg daily for life per infectious disease Dr. Choudhury in [place], she had MSSA staph growing from the cultures,    Bilateral screening mammogram benign March 4, 2025    Bone density test normal March 4, 2025    Colonoscopy 2022--    Ct abd/ pelvis --sept 2023, normal     FOOT SURGERY /right shoulder arthroplasty,    CAUTIONED ON ETOH USE ---           Follow Up   Return in about 6 months (around 11/12/2025).  Patient was given instructions and counseling regarding her condition or for health maintenance advice. Please see specific information pulled into the AVS if appropriate.

## 2025-05-23 RX ORDER — MONTELUKAST SODIUM 10 MG/1
10 TABLET ORAL DAILY
Qty: 90 TABLET | Refills: 1 | Status: SHIPPED | OUTPATIENT
Start: 2025-05-23

## 2025-05-26 DIAGNOSIS — E78.2 MIXED HYPERLIPIDEMIA: ICD-10-CM

## 2025-05-26 DIAGNOSIS — Z00.00 WELCOME TO MEDICARE PREVENTIVE VISIT: ICD-10-CM

## 2025-05-27 RX ORDER — PANTOPRAZOLE SODIUM 40 MG/1
40 TABLET, DELAYED RELEASE ORAL NIGHTLY
Qty: 90 TABLET | Refills: 3 | Status: SHIPPED | OUTPATIENT
Start: 2025-05-27

## 2025-06-03 RX ORDER — VALACYCLOVIR HYDROCHLORIDE 500 MG/1
500 TABLET, FILM COATED ORAL 2 TIMES DAILY
Qty: 20 TABLET | Refills: 0 | Status: SHIPPED | OUTPATIENT
Start: 2025-06-03

## 2025-06-03 NOTE — TELEPHONE ENCOUNTER
Caller: Vaishali Castañeda    Relationship: Self    What medication are you requesting:PREDNISONE     What are your current symptoms: SWELLING FACE     How long have you been experiencing symptoms: 12 HOURS     Have you had these symptoms before:    [x] Yes  [] No    Have you been treated for these symptoms before:   [x] Yes  [] No    If a prescription is needed, what is your preferred pharmacy and phone number: [facility]/PHARMACY #[id number] - [place], [place] - [address] [phone] [facility] [phone] FX     Additional notes:

## 2025-06-03 NOTE — TELEPHONE ENCOUNTER
Did you want to fill prednisone as well to pharmacy in [place] for swelling in face? I did speak to pt and she said you've given this to her before and it made the swelling go down instantly or do you want pt to go to urgent care

## 2025-06-03 NOTE — TELEPHONE ENCOUNTER
Caller: Vaishali Castañeda    Relationship: Self    Best call back number: [phone]    Requested Prescriptions:   Requested Prescriptions     Pending Prescriptions Disp Refills    valACYclovir (Valtrex) 500 MG tablet 20 tablet 0     Sig: Take 1 tablet by mouth 2 (Two) Times a Day.        Pharmacy where request should be sent: [facility]/PHARMACY #[id number] [facility] [address] [facility] [phone] [facility] [phone] FX     Last office visit with prescribing clinician: 5/12/2025   Last telemedicine visit with prescribing clinician: Visit date not found   Next office visit with prescribing clinician: 11/12/2025     Additional details provided by patient: PATIENT IS OUT OF TOWN ON VACATION AND FORGET HER MEDICATION AND REQUESTING A REFILL SEND TO THE PHARMACY IN [place].       Nicol Yang Rep   06/03/25 09:03 EDT

## 2025-06-11 RX ORDER — SERTRALINE HYDROCHLORIDE 25 MG/1
50 TABLET, FILM COATED ORAL DAILY
Qty: 180 TABLET | Refills: 1 | Status: SHIPPED | OUTPATIENT
Start: 2025-06-11

## 2025-06-11 NOTE — TELEPHONE ENCOUNTER
Rx Refill Note  Requested Prescriptions     Pending Prescriptions Disp Refills    sertraline (ZOLOFT) 25 MG tablet [Pharmacy Med Name: SERTRALINE 25MG TABLETS] 180 tablet 1     Sig: TAKE 2 TABLETS BY MOUTH DAILY      Last office visit with prescribing clinician: 5/12/2025   Last telemedicine visit with prescribing clinician: Visit date not found   Next office visit with prescribing clinician: 11/12/2025                         Would you like a call back once the refill request has been completed: [] Yes [] No    If the office needs to give you a call back, can they leave a voicemail: [] Yes [] No    Jeanna Hernandez MA  06/11/25, 09:07 EDT

## 2025-06-18 RX ORDER — LISINOPRIL 10 MG/1
10 TABLET ORAL DAILY
Qty: 90 TABLET | Refills: 1 | Status: SHIPPED | OUTPATIENT
Start: 2025-06-18

## 2025-07-08 ENCOUNTER — TELEPHONE (OUTPATIENT)
Dept: INTERNAL MEDICINE | Age: 65
End: 2025-07-08

## 2025-07-23 RX ORDER — VALACYCLOVIR HYDROCHLORIDE 500 MG/1
500 TABLET, FILM COATED ORAL 2 TIMES DAILY
Qty: 20 TABLET | Refills: 0 | Status: SHIPPED | OUTPATIENT
Start: 2025-07-23

## 2025-07-23 NOTE — TELEPHONE ENCOUNTER
Caller: [id number] - [place], [place] - [address] - [phone] [facility] [phone] FX    Relationship: Pharmacy    Best call back number: [phone]    Requested Prescriptions:   Requested Prescriptions     Pending Prescriptions Disp Refills    valACYclovir (Valtrex) 500 MG tablet 20 tablet 0     Sig: Take 1 tablet by mouth 2 (Two) Times a Day.        Pharmacy where request should be sent:      Last office visit with prescribing clinician: 5/12/2025   Last telemedicine visit with prescribing clinician: Visit date not found   Next office visit with prescribing clinician: 11/12/2025     Additional details provided by patient: n/a    Does the patient have less than a 3 day supply:  [] Yes  [] No    Would you like a call back once the refill request has been completed: [] Yes [] No    If the office needs to give you a call back, can they leave a voicemail: [] Yes [] No    Jose Baldwin MA   07/23/25 18:01 EDT

## 2025-07-24 ENCOUNTER — TELEPHONE (OUTPATIENT)
Dept: INTERNAL MEDICINE | Age: 65
End: 2025-07-24
Payer: MEDICARE

## 2025-07-24 DIAGNOSIS — K59.00 CONSTIPATION, UNSPECIFIED CONSTIPATION TYPE: Primary | ICD-10-CM

## 2025-07-24 NOTE — TELEPHONE ENCOUNTER
On call note-c/o constipation for 2 months-only a little coming out-loose BM -a little today and every day-    Take Mg Citrate today and repeat in 2-3 hrs if no good BM, take miralax and dulcolax daily for next week then prn  May need repeat cscope if not better  F/u with PCP in 2 weeks if not better or sooner  Eat high fiber diet/probiotics/drink lots of fluids daily

## (undated) DEVICE — GLV SURG BIOGEL LTX PF 8

## (undated) DEVICE — SUT PROLN 3/0 FS2 18IN 8665G

## (undated) DEVICE — BIT DRL JUGGERKNOT 2.9MM

## (undated) DEVICE — SKIN PREP TRAY W/CHG: Brand: MEDLINE INDUSTRIES, INC.

## (undated) DEVICE — GLV SURG SIGNATURE ESSENTIAL PF LTX SZ8

## (undated) DEVICE — DRAPE,SHOULDER,BEACH ULTRAGARD: Brand: MEDLINE

## (undated) DEVICE — BLD SHAVER BONECUTTER 5MM 13CM

## (undated) DEVICE — CANNULA THREADED DISP 8.5 X 72MM: Brand: CLEAR-TRAC

## (undated) DEVICE — ABL ASP APOLLO RF XL 90D

## (undated) DEVICE — PK ARTHSCP SHLDR TOWER 40

## (undated) DEVICE — DRSNG GZ PETROLTM XEROFORM CURAD 1X8IN STRL

## (undated) DEVICE — CLEAR-TRAC 7.0 MM X 72 MM THREADED                                    CANNULA, WITH DISPOSABLE OBTURATOR,                                    GREY, STERILE: Brand: CLEAR-TRAC

## (undated) DEVICE — TP NDL SCORPION MULTIFIRE